# Patient Record
Sex: FEMALE | Race: WHITE | Employment: FULL TIME | URBAN - METROPOLITAN AREA
[De-identification: names, ages, dates, MRNs, and addresses within clinical notes are randomized per-mention and may not be internally consistent; named-entity substitution may affect disease eponyms.]

---

## 2017-07-19 ENCOUNTER — HOSPITAL ENCOUNTER (EMERGENCY)
Age: 22
Discharge: HOME | End: 2017-07-19
Payer: COMMERCIAL

## 2017-07-19 VITALS
BODY MASS INDEX: 30.61 KG/M2 | RESPIRATION RATE: 18 BRPM | HEIGHT: 67 IN | TEMPERATURE: 98 F | OXYGEN SATURATION: 98 % | HEART RATE: 61 BPM | SYSTOLIC BLOOD PRESSURE: 130 MMHG | DIASTOLIC BLOOD PRESSURE: 78 MMHG | WEIGHT: 195 LBS

## 2017-07-19 DIAGNOSIS — L02.91 ABSCESS: Primary | ICD-10-CM

## 2017-07-19 PROCEDURE — 99282 EMERGENCY DEPT VISIT SF MDM: CPT

## 2017-07-19 RX ORDER — ACETAMINOPHEN AND CODEINE PHOSPHATE 300; 30 MG/1; MG/1
1 TABLET ORAL EVERY 4 HOURS PRN
Qty: 10 TABLET | Refills: 0 | Status: ON HOLD | OUTPATIENT
Start: 2017-07-19 | End: 2020-06-06 | Stop reason: HOSPADM

## 2017-07-19 RX ORDER — IBUPROFEN 800 MG/1
800 TABLET ORAL EVERY 8 HOURS PRN
Qty: 20 TABLET | Refills: 0 | Status: ON HOLD | OUTPATIENT
Start: 2017-07-19 | End: 2020-06-06 | Stop reason: HOSPADM

## 2017-07-19 RX ORDER — CLINDAMYCIN HYDROCHLORIDE 300 MG/1
300 CAPSULE ORAL 4 TIMES DAILY
Qty: 40 CAPSULE | Refills: 0 | Status: SHIPPED | OUTPATIENT
Start: 2017-07-19 | End: 2017-07-29

## 2017-07-19 ASSESSMENT — PAIN DESCRIPTION - LOCATION: LOCATION: ARM

## 2017-07-19 ASSESSMENT — ENCOUNTER SYMPTOMS
ABDOMINAL PAIN: 0
NAUSEA: 0
COUGH: 0
ROS SKIN COMMENTS: ABSCESS
VOMITING: 0
SHORTNESS OF BREATH: 0
DIARRHEA: 0

## 2017-07-19 ASSESSMENT — PAIN DESCRIPTION - PAIN TYPE: TYPE: ACUTE PAIN

## 2017-07-19 ASSESSMENT — PAIN DESCRIPTION - DESCRIPTORS: DESCRIPTORS: SHARP

## 2017-07-19 ASSESSMENT — PAIN DESCRIPTION - ORIENTATION: ORIENTATION: RIGHT

## 2017-07-19 ASSESSMENT — PAIN SCALES - GENERAL: PAINLEVEL_OUTOF10: 8

## 2017-07-19 ASSESSMENT — PAIN DESCRIPTION - FREQUENCY: FREQUENCY: CONTINUOUS

## 2020-06-05 ENCOUNTER — APPOINTMENT (OUTPATIENT)
Dept: ULTRASOUND IMAGING | Age: 25
End: 2020-06-05
Payer: COMMERCIAL

## 2020-06-05 ENCOUNTER — HOSPITAL ENCOUNTER (OUTPATIENT)
Age: 25
Setting detail: OBSERVATION
Discharge: HOME | End: 2020-06-06
Attending: OBSTETRICS & GYNECOLOGY | Admitting: OBSTETRICS & GYNECOLOGY
Payer: COMMERCIAL

## 2020-06-05 LAB
ABO/RH: NORMAL
ALBUMIN SERPL-MCNC: 4.1 G/DL (ref 3.5–4.6)
ALP BLD-CCNC: 52 U/L (ref 40–130)
ALT SERPL-CCNC: 12 U/L (ref 0–33)
ANION GAP SERPL CALCULATED.3IONS-SCNC: 9 MEQ/L (ref 9–15)
ANTIBODY SCREEN: NORMAL
APTT: 33.5 SEC (ref 24.4–36.8)
AST SERPL-CCNC: 11 U/L (ref 0–35)
BACTERIA: ABNORMAL /HPF
BASOPHILS ABSOLUTE: 0 K/UL (ref 0–0.2)
BASOPHILS RELATIVE PERCENT: 0.2 %
BILIRUB SERPL-MCNC: 0.6 MG/DL (ref 0.2–0.7)
BILIRUBIN URINE: NEGATIVE
BLOOD, URINE: ABNORMAL
BUN BLDV-MCNC: 8 MG/DL (ref 6–20)
CALCIUM SERPL-MCNC: 9.3 MG/DL (ref 8.5–9.9)
CHLORIDE BLD-SCNC: 96 MEQ/L (ref 95–107)
CLARITY: ABNORMAL
CO2: 26 MEQ/L (ref 20–31)
COLOR: YELLOW
CREAT SERPL-MCNC: 0.61 MG/DL (ref 0.5–0.9)
EOSINOPHILS ABSOLUTE: 0 K/UL (ref 0–0.7)
EOSINOPHILS RELATIVE PERCENT: 0.2 %
GFR AFRICAN AMERICAN: >60
GFR NON-AFRICAN AMERICAN: >60
GLOBULIN: 3.3 G/DL (ref 2.3–3.5)
GLUCOSE BLD-MCNC: 104 MG/DL (ref 70–99)
GLUCOSE URINE: NEGATIVE MG/DL
GONADOTROPIN, CHORIONIC (HCG) QUANT: 414.4 MIU/ML
HCG(URINE) PREGNANCY TEST: POSITIVE
HCG, URINE, POC: POSITIVE
HCT VFR BLD CALC: 40.5 % (ref 37–47)
HEMOGLOBIN: 13.5 G/DL (ref 12–16)
HEPATITIS C ANTIBODY INTERPRETATION: NORMAL
INR BLD: 1.1
KETONES, URINE: 15 MG/DL
LACTIC ACID, SEPSIS: 1.3 MMOL/L (ref 0.5–1.9)
LEUKOCYTE ESTERASE, URINE: ABNORMAL
LIPASE: 13 U/L (ref 12–95)
LYMPHOCYTES ABSOLUTE: 1.1 K/UL (ref 1–4.8)
LYMPHOCYTES RELATIVE PERCENT: 12.4 %
Lab: NORMAL
MCH RBC QN AUTO: 29 PG (ref 27–31.3)
MCHC RBC AUTO-ENTMCNC: 33.4 % (ref 33–37)
MCV RBC AUTO: 87 FL (ref 82–100)
MONOCYTES ABSOLUTE: 0.8 K/UL (ref 0.2–0.8)
MONOCYTES RELATIVE PERCENT: 8.8 %
NEGATIVE QC PASS/FAIL: NORMAL
NEUTROPHILS ABSOLUTE: 6.9 K/UL (ref 1.4–6.5)
NEUTROPHILS RELATIVE PERCENT: 78.4 %
NITRITE, URINE: POSITIVE
PDW BLD-RTO: 13 % (ref 11.5–14.5)
PH UA: 5.5 (ref 5–9)
PLATELET # BLD: 180 K/UL (ref 130–400)
POSITIVE QC PASS/FAIL: NORMAL
POTASSIUM SERPL-SCNC: 3.8 MEQ/L (ref 3.4–4.9)
PROTEIN UA: 30 MG/DL
PROTHROMBIN TIME: 14.3 SEC (ref 12.3–14.9)
RBC # BLD: 4.66 M/UL (ref 4.2–5.4)
RBC UA: ABNORMAL /HPF (ref 0–2)
SARS-COV-2, NAAT: NOT DETECTED
SODIUM BLD-SCNC: 131 MEQ/L (ref 135–144)
SPECIFIC GRAVITY UA: 1.02 (ref 1–1.03)
TOTAL PROTEIN: 7.4 G/DL (ref 6.3–8)
URINE REFLEX TO CULTURE: YES
UROBILINOGEN, URINE: 0.2 E.U./DL
WBC # BLD: 8.8 K/UL (ref 4.8–10.8)
WBC UA: ABNORMAL /HPF (ref 0–5)

## 2020-06-05 PROCEDURE — 76801 OB US < 14 WKS SINGLE FETUS: CPT

## 2020-06-05 PROCEDURE — 87186 SC STD MICRODIL/AGAR DIL: CPT

## 2020-06-05 PROCEDURE — 86901 BLOOD TYPING SEROLOGIC RH(D): CPT

## 2020-06-05 PROCEDURE — 6360000002 HC RX W HCPCS: Performed by: OBSTETRICS & GYNECOLOGY

## 2020-06-05 PROCEDURE — 81001 URINALYSIS AUTO W/SCOPE: CPT

## 2020-06-05 PROCEDURE — 87086 URINE CULTURE/COLONY COUNT: CPT

## 2020-06-05 PROCEDURE — 86592 SYPHILIS TEST NON-TREP QUAL: CPT

## 2020-06-05 PROCEDURE — 6370000000 HC RX 637 (ALT 250 FOR IP): Performed by: PHYSICIAN ASSISTANT

## 2020-06-05 PROCEDURE — 83690 ASSAY OF LIPASE: CPT

## 2020-06-05 PROCEDURE — 96372 THER/PROPH/DIAG INJ SC/IM: CPT

## 2020-06-05 PROCEDURE — 84702 CHORIONIC GONADOTROPIN TEST: CPT

## 2020-06-05 PROCEDURE — 85025 COMPLETE CBC W/AUTO DIFF WBC: CPT

## 2020-06-05 PROCEDURE — 80053 COMPREHEN METABOLIC PANEL: CPT

## 2020-06-05 PROCEDURE — 87077 CULTURE AEROBIC IDENTIFY: CPT

## 2020-06-05 PROCEDURE — 76817 TRANSVAGINAL US OBSTETRIC: CPT

## 2020-06-05 PROCEDURE — 6360000002 HC RX W HCPCS: Performed by: PHYSICIAN ASSISTANT

## 2020-06-05 PROCEDURE — 99218 PR INITIAL OBSERVATION CARE/DAY 30 MINUTES: CPT | Performed by: OBSTETRICS & GYNECOLOGY

## 2020-06-05 PROCEDURE — 85730 THROMBOPLASTIN TIME PARTIAL: CPT

## 2020-06-05 PROCEDURE — 86900 BLOOD TYPING SEROLOGIC ABO: CPT

## 2020-06-05 PROCEDURE — 86850 RBC ANTIBODY SCREEN: CPT

## 2020-06-05 PROCEDURE — 83605 ASSAY OF LACTIC ACID: CPT

## 2020-06-05 PROCEDURE — 86803 HEPATITIS C AB TEST: CPT

## 2020-06-05 PROCEDURE — 87040 BLOOD CULTURE FOR BACTERIA: CPT

## 2020-06-05 PROCEDURE — 96401 CHEMO ANTI-NEOPL SQ/IM: CPT

## 2020-06-05 PROCEDURE — 2580000003 HC RX 258: Performed by: PHYSICIAN ASSISTANT

## 2020-06-05 PROCEDURE — 96375 TX/PRO/DX INJ NEW DRUG ADDON: CPT

## 2020-06-05 PROCEDURE — 99284 EMERGENCY DEPT VISIT MOD MDM: CPT

## 2020-06-05 PROCEDURE — 84703 CHORIONIC GONADOTROPIN ASSAY: CPT

## 2020-06-05 PROCEDURE — 36415 COLL VENOUS BLD VENIPUNCTURE: CPT

## 2020-06-05 PROCEDURE — 96365 THER/PROPH/DIAG IV INF INIT: CPT

## 2020-06-05 PROCEDURE — 2580000003 HC RX 258: Performed by: OBSTETRICS & GYNECOLOGY

## 2020-06-05 PROCEDURE — 96366 THER/PROPH/DIAG IV INF ADDON: CPT

## 2020-06-05 PROCEDURE — 85610 PROTHROMBIN TIME: CPT

## 2020-06-05 PROCEDURE — G0378 HOSPITAL OBSERVATION PER HR: HCPCS

## 2020-06-05 PROCEDURE — U0002 COVID-19 LAB TEST NON-CDC: HCPCS

## 2020-06-05 RX ORDER — METHOTREXATE 25 MG/ML
50 INJECTION, SOLUTION INTRA-ARTERIAL; INTRAMUSCULAR; INTRAVENOUS ONCE
Status: COMPLETED | OUTPATIENT
Start: 2020-06-05 | End: 2020-06-05

## 2020-06-05 RX ORDER — ACETAMINOPHEN 325 MG/1
650 TABLET ORAL ONCE
Status: COMPLETED | OUTPATIENT
Start: 2020-06-05 | End: 2020-06-05

## 2020-06-05 RX ORDER — ACETAMINOPHEN 325 MG/1
650 TABLET ORAL EVERY 4 HOURS PRN
Status: DISCONTINUED | OUTPATIENT
Start: 2020-06-05 | End: 2020-06-06 | Stop reason: HOSPADM

## 2020-06-05 RX ORDER — 0.9 % SODIUM CHLORIDE 0.9 %
1000 INTRAVENOUS SOLUTION INTRAVENOUS ONCE
Status: COMPLETED | OUTPATIENT
Start: 2020-06-05 | End: 2020-06-05

## 2020-06-05 RX ORDER — ONDANSETRON 2 MG/ML
4 INJECTION INTRAMUSCULAR; INTRAVENOUS EVERY 6 HOURS PRN
Status: DISCONTINUED | OUTPATIENT
Start: 2020-06-05 | End: 2020-06-06 | Stop reason: HOSPADM

## 2020-06-05 RX ORDER — METOCLOPRAMIDE HYDROCHLORIDE 5 MG/ML
10 INJECTION INTRAMUSCULAR; INTRAVENOUS EVERY 6 HOURS
Status: DISCONTINUED | OUTPATIENT
Start: 2020-06-05 | End: 2020-06-06 | Stop reason: HOSPADM

## 2020-06-05 RX ORDER — SODIUM CHLORIDE, SODIUM LACTATE, POTASSIUM CHLORIDE, CALCIUM CHLORIDE 600; 310; 30; 20 MG/100ML; MG/100ML; MG/100ML; MG/100ML
INJECTION, SOLUTION INTRAVENOUS CONTINUOUS
Status: DISCONTINUED | OUTPATIENT
Start: 2020-06-05 | End: 2020-06-06 | Stop reason: HOSPADM

## 2020-06-05 RX ADMIN — ACETAMINOPHEN 650 MG: 325 TABLET ORAL at 17:42

## 2020-06-05 RX ADMIN — SODIUM CHLORIDE, POTASSIUM CHLORIDE, SODIUM LACTATE AND CALCIUM CHLORIDE: 600; 310; 30; 20 INJECTION, SOLUTION INTRAVENOUS at 21:55

## 2020-06-05 RX ADMIN — CEFTRIAXONE SODIUM 1 G: 1 INJECTION, POWDER, FOR SOLUTION INTRAMUSCULAR; INTRAVENOUS at 18:12

## 2020-06-05 RX ADMIN — METOCLOPRAMIDE HYDROCHLORIDE 10 MG: 5 INJECTION INTRAMUSCULAR; INTRAVENOUS at 22:49

## 2020-06-05 RX ADMIN — SODIUM CHLORIDE 1000 ML: 9 INJECTION, SOLUTION INTRAVENOUS at 18:10

## 2020-06-05 RX ADMIN — METHOTREXATE SODIUM 95.5 MG: 25 INJECTION, SOLUTION INTRA-ARTERIAL; INTRAMUSCULAR; INTRAVENOUS at 18:40

## 2020-06-05 ASSESSMENT — PAIN SCALES - GENERAL
PAINLEVEL_OUTOF10: 8
PAINLEVEL_OUTOF10: 8
PAINLEVEL_OUTOF10: 3

## 2020-06-05 ASSESSMENT — PAIN DESCRIPTION - PAIN TYPE
TYPE: ACUTE PAIN
TYPE: ACUTE PAIN

## 2020-06-05 ASSESSMENT — ENCOUNTER SYMPTOMS
EYES NEGATIVE: 1
BACK PAIN: 1
GASTROINTESTINAL NEGATIVE: 1
RESPIRATORY NEGATIVE: 1

## 2020-06-05 ASSESSMENT — PAIN DESCRIPTION - ORIENTATION: ORIENTATION: RIGHT

## 2020-06-05 ASSESSMENT — PAIN DESCRIPTION - LOCATION
LOCATION: FLANK
LOCATION: ABDOMEN

## 2020-06-05 NOTE — ED PROVIDER NOTES
[address] [facility] ED  eMERGENCY dEPARTMENT eNCOUnter      Pt Name: Juan M Ruggiero  MRN: [id number]  [place] 1995  Date of evaluation: 6/5/2020  Provider: Eveline Lemon PA-C      HISTORY OF PRESENT ILLNESS    Juan M Ruggiero is a 27 y.o. female who presents to the Emergency Department with chief complaint of urinary frequency and right flank pain for 2 days. Patient states she got on her period over the last week and then bleeding for about 7 days. Patient states that essentially ended yesterday. Patient admits still does not feeling well today. She also admits to some mild nausea. Patient has no other concerns this time. REVIEW OF SYSTEMS       Review of Systems   Constitutional: Positive for fever. HENT: Negative. Eyes: Negative. Respiratory: Negative. Cardiovascular: Negative. Gastrointestinal: Negative. Right flank pain     Endocrine: Negative. Genitourinary: Negative. Musculoskeletal: Positive for back pain. Skin: Negative. Neurological: Negative. Psychiatric/Behavioral: Negative. PAST MEDICAL HISTORY   No past medical history on file. SURGICAL HISTORY     No past surgical history on file. CURRENT MEDICATIONS       Previous Medications    ACETAMINOPHEN-CODEINE (TYLENOL/CODEINE #3) 300-30 MG PER TABLET    Take 1 tablet by mouth every 4 hours as needed for Pain    IBUPROFEN (ADVIL;MOTRIN) 800 MG TABLET    Take 1 tablet by mouth every 8 hours as needed for Pain       ALLERGIES     Patient has no known allergies. FAMILY HISTORY     No family history on file.        SOCIAL HISTORY       Social History     Socioeconomic History    Marital status: Single     Spouse name: Not on file    Number of children: Not on file    Years of education: Not on file    Highest education level: Not on file   Occupational History    Not on file   Social Needs    Financial resource strain: Not on file    Food insecurity     Worry: Not on file

## 2020-06-05 NOTE — ED NOTES
Patient to US via cart. Fluids and tubing on ed cart 28. Tolerated well.        Jamarcus Pickens  06/05/20 1444

## 2020-06-05 NOTE — H&P
Department of Obstetrics and Gynecology   History & Physical    Pt Name: Leim Goodpasture  MRN: [id number] Katie #: [de-identified]  YOB: 1995  Estimated Date of Delivery: None noted. HPI: The patient is a 27 y.o. [id number] female who presents to ER with right flank pain and initial concern by PA was possible uti or kidney stone; however, HCG returns + at 414 and pelvic sono shows nothing in uterus, but gest sac in right tube with no yolk sac or fetus with measurements c/w 4w6d gest; some complex fluid is reported adjacent to this area and in cul-de-sac; patient also found to have significant uti with large leuks and + nitrites ( uc is pending and patient did receive rocephin, 1 gm iv in ER ); her blood type is O+; prior ob hx/o SAB ~ 3 years ago and did not require a D&C; specifically denies any prior STD's; currently does not have an ob/gyn; does not believe she has had a Pap smear in past; BCM-condoms; LMP-5/25/20 and this is described as normal for her; typically flows for 4-5 days q month with 2-3 days heavy; spoke with patient regarding options for treating unruptured early ectopic as eithe surgical or medical with MTX; at this time patient elects for medical management and she is aware that close f/u with local ob/gyn will be necessary to follow HCG levels to zero and that one possibility is that if ectopic persists or ruptures, she would need surgical intervention; spoke with Dr. Millie Kong about this patient and clinical findings; he is willing to take her on as a new patient and states that he would like to see her in his office in 1 week for f/u; at this time she will be coming in hospital for iv hydration and further rx of uti; a COVID-19 test will be done in ER before patient is transferred to floor. Allergies:     Allergies as of 06/05/2020    (No Known Allergies)       Medications:    Current Facility-Administered Medications:     0.9 % sodium chloride bolus, 1,000 mL, Intravenous, Once, Mera Awan through it's conclusion.   Sylwia Mcneill MD

## 2020-06-06 VITALS
RESPIRATION RATE: 18 BRPM | OXYGEN SATURATION: 99 % | SYSTOLIC BLOOD PRESSURE: 116 MMHG | HEIGHT: 66 IN | HEART RATE: 81 BPM | WEIGHT: 173 LBS | TEMPERATURE: 98.5 F | DIASTOLIC BLOOD PRESSURE: 64 MMHG | BODY MASS INDEX: 27.8 KG/M2

## 2020-06-06 PROBLEM — O00.90 ECTOPIC PREGNANCY WITHOUT INTRAUTERINE PREGNANCY: Status: ACTIVE | Noted: 2020-06-06

## 2020-06-06 LAB
ANION GAP SERPL CALCULATED.3IONS-SCNC: 9 MEQ/L (ref 9–15)
BASOPHILS ABSOLUTE: 0 K/UL (ref 0–0.2)
BASOPHILS RELATIVE PERCENT: 0.6 %
BUN BLDV-MCNC: 5 MG/DL (ref 6–20)
CALCIUM SERPL-MCNC: 9.5 MG/DL (ref 8.5–9.9)
CHLORIDE BLD-SCNC: 102 MEQ/L (ref 95–107)
CO2: 26 MEQ/L (ref 20–31)
CREAT SERPL-MCNC: 0.51 MG/DL (ref 0.5–0.9)
EOSINOPHILS ABSOLUTE: 0 K/UL (ref 0–0.7)
EOSINOPHILS RELATIVE PERCENT: 0.4 %
GFR AFRICAN AMERICAN: >60
GFR NON-AFRICAN AMERICAN: >60
GLUCOSE BLD-MCNC: 102 MG/DL (ref 70–99)
GONADOTROPIN, CHORIONIC (HCG) QUANT: 336.1 MIU/ML
HCT VFR BLD CALC: 38.3 % (ref 37–47)
HEMOGLOBIN: 13 G/DL (ref 12–16)
LYMPHOCYTES ABSOLUTE: 1 K/UL (ref 1–4.8)
LYMPHOCYTES RELATIVE PERCENT: 14.6 %
MCH RBC QN AUTO: 29.4 PG (ref 27–31.3)
MCHC RBC AUTO-ENTMCNC: 33.9 % (ref 33–37)
MCV RBC AUTO: 86.8 FL (ref 82–100)
MONOCYTES ABSOLUTE: 0.5 K/UL (ref 0.2–0.8)
MONOCYTES RELATIVE PERCENT: 7.4 %
NEUTROPHILS ABSOLUTE: 5.5 K/UL (ref 1.4–6.5)
NEUTROPHILS RELATIVE PERCENT: 77 %
PDW BLD-RTO: 13 % (ref 11.5–14.5)
PLATELET # BLD: 178 K/UL (ref 130–400)
POTASSIUM SERPL-SCNC: 4.2 MEQ/L (ref 3.4–4.9)
RBC # BLD: 4.41 M/UL (ref 4.2–5.4)
RPR: NORMAL
SODIUM BLD-SCNC: 137 MEQ/L (ref 135–144)
WBC # BLD: 7.2 K/UL (ref 4.8–10.8)

## 2020-06-06 PROCEDURE — 99217 PR OBSERVATION CARE DISCHARGE MANAGEMENT: CPT | Performed by: OBSTETRICS & GYNECOLOGY

## 2020-06-06 PROCEDURE — 36415 COLL VENOUS BLD VENIPUNCTURE: CPT

## 2020-06-06 PROCEDURE — 6360000002 HC RX W HCPCS: Performed by: OBSTETRICS & GYNECOLOGY

## 2020-06-06 PROCEDURE — 80048 BASIC METABOLIC PNL TOTAL CA: CPT

## 2020-06-06 PROCEDURE — 2580000003 HC RX 258: Performed by: OBSTETRICS & GYNECOLOGY

## 2020-06-06 PROCEDURE — 84702 CHORIONIC GONADOTROPIN TEST: CPT

## 2020-06-06 PROCEDURE — 85025 COMPLETE CBC W/AUTO DIFF WBC: CPT

## 2020-06-06 PROCEDURE — 96376 TX/PRO/DX INJ SAME DRUG ADON: CPT

## 2020-06-06 PROCEDURE — G0378 HOSPITAL OBSERVATION PER HR: HCPCS

## 2020-06-06 PROCEDURE — 87389 HIV-1 AG W/HIV-1&-2 AB AG IA: CPT

## 2020-06-06 PROCEDURE — 96375 TX/PRO/DX INJ NEW DRUG ADDON: CPT

## 2020-06-06 RX ORDER — ONDANSETRON 4 MG/1
4 TABLET, ORALLY DISINTEGRATING ORAL 3 TIMES DAILY PRN
Qty: 21 TABLET | Refills: 0 | Status: SHIPPED | OUTPATIENT
Start: 2020-06-06 | End: 2021-07-06

## 2020-06-06 RX ORDER — CEPHALEXIN 500 MG/1
500 CAPSULE ORAL 2 TIMES DAILY
Qty: 14 CAPSULE | Refills: 0 | Status: SHIPPED | OUTPATIENT
Start: 2020-06-06 | End: 2020-06-13

## 2020-06-06 RX ADMIN — SODIUM CHLORIDE, POTASSIUM CHLORIDE, SODIUM LACTATE AND CALCIUM CHLORIDE: 600; 310; 30; 20 INJECTION, SOLUTION INTRAVENOUS at 04:51

## 2020-06-06 RX ADMIN — ONDANSETRON 4 MG: 2 INJECTION INTRAMUSCULAR; INTRAVENOUS at 02:22

## 2020-06-06 RX ADMIN — METOCLOPRAMIDE HYDROCHLORIDE 10 MG: 5 INJECTION INTRAMUSCULAR; INTRAVENOUS at 04:52

## 2020-06-06 RX ADMIN — CEFTRIAXONE SODIUM 1 G: 1 INJECTION, POWDER, FOR SOLUTION INTRAMUSCULAR; INTRAVENOUS at 07:25

## 2020-06-06 NOTE — PLAN OF CARE
Problem: Nausea/Vomiting:  Goal: Absence of nausea/vomiting  Description: Absence of nausea/vomiting  6/6/2020 0920 by Mikie Sharp RN  Outcome: Completed  6/6/2020 0020 by Desi De La Cruz RN  Outcome: Ongoing  Goal: Able to drink  Description: Able to drink  6/6/2020 0920 by Mikie Sharp RN  Outcome: Completed  6/6/2020 0020 by Desi De La Cruz RN  Outcome: Ongoing  Goal: Able to eat  Description: Able to eat  6/6/2020 0920 by Mikie Sharp RN  Outcome: Completed  6/6/2020 0020 by Desi De La Cruz RN  Outcome: Ongoing     Problem: Bleeding:  Goal: Will show no signs and symptoms of excessive bleeding  Description: Will show no signs and symptoms of excessive bleeding  6/6/2020 0920 by Mikie Sharp RN  Outcome: Completed  6/6/2020 0020 by Desi De La Cruz RN  Outcome: Ongoing

## 2020-06-06 NOTE — FLOWSHEET NOTE
Spoke with Dr. Nargis Mcclelland. Would like Rocephin time changed to now.    Electronically signed by Portia Schmitz RN on 6/6/2020 at 6:52 AM

## 2020-06-07 LAB
ORGANISM: ABNORMAL
URINE CULTURE, ROUTINE: ABNORMAL

## 2020-06-08 ENCOUNTER — NURSE ONLY (OUTPATIENT)
Dept: OBGYN CLINIC | Age: 25
End: 2020-06-08

## 2020-06-08 DIAGNOSIS — O00.101 RIGHT TUBAL PREGNANCY WITHOUT INTRAUTERINE PREGNANCY: ICD-10-CM

## 2020-06-08 LAB
GONADOTROPIN, CHORIONIC (HCG) QUANT: 157 MIU/ML
HIV 1,2 COMBO ANTIGEN/ANTIBODY: NEGATIVE

## 2020-06-08 PROCEDURE — 36415 COLL VENOUS BLD VENIPUNCTURE: CPT | Performed by: OBSTETRICS & GYNECOLOGY

## 2020-06-09 ENCOUNTER — OFFICE VISIT (OUTPATIENT)
Dept: OBGYN CLINIC | Age: 25
End: 2020-06-09
Payer: COMMERCIAL

## 2020-06-09 VITALS
WEIGHT: 175 LBS | SYSTOLIC BLOOD PRESSURE: 104 MMHG | HEART RATE: 84 BPM | BODY MASS INDEX: 28.12 KG/M2 | HEIGHT: 66 IN | DIASTOLIC BLOOD PRESSURE: 66 MMHG

## 2020-06-09 DIAGNOSIS — O00.109 TUBAL ECTOPIC PREGNANCY, UNSPECIFIED LATERALITY, UNSPECIFIED WHETHER INTRAUTERINE PREGNANCY PRESENT: ICD-10-CM

## 2020-06-09 LAB — GONADOTROPIN, CHORIONIC (HCG) QUANT: 177.4 MIU/ML

## 2020-06-09 PROCEDURE — 99213 OFFICE O/P EST LOW 20 MIN: CPT | Performed by: OBSTETRICS & GYNECOLOGY

## 2020-06-09 PROCEDURE — 1111F DSCHRG MED/CURRENT MED MERGE: CPT | Performed by: OBSTETRICS & GYNECOLOGY

## 2020-06-10 LAB
BLOOD CULTURE, ROUTINE: NORMAL
CULTURE, BLOOD 2: NORMAL

## 2020-06-17 LAB — GONADOTROPIN, CHORIONIC (HCG) QUANT: 136.9 MIU/ML

## 2021-07-06 ENCOUNTER — OFFICE VISIT (OUTPATIENT)
Dept: OBGYN CLINIC | Age: 26
End: 2021-07-06
Payer: COMMERCIAL

## 2021-07-06 VITALS
SYSTOLIC BLOOD PRESSURE: 118 MMHG | HEART RATE: 76 BPM | HEIGHT: 66 IN | DIASTOLIC BLOOD PRESSURE: 72 MMHG | WEIGHT: 192 LBS | BODY MASS INDEX: 30.86 KG/M2

## 2021-07-06 DIAGNOSIS — O20.0 THREATENED ABORTION IN FIRST TRIMESTER: Primary | ICD-10-CM

## 2021-07-06 PROCEDURE — G8427 DOCREV CUR MEDS BY ELIG CLIN: HCPCS | Performed by: OBSTETRICS & GYNECOLOGY

## 2021-07-06 PROCEDURE — 4004F PT TOBACCO SCREEN RCVD TLK: CPT | Performed by: OBSTETRICS & GYNECOLOGY

## 2021-07-06 PROCEDURE — 99203 OFFICE O/P NEW LOW 30 MIN: CPT | Performed by: OBSTETRICS & GYNECOLOGY

## 2021-07-06 PROCEDURE — G8419 CALC BMI OUT NRM PARAM NOF/U: HCPCS | Performed by: OBSTETRICS & GYNECOLOGY

## 2021-07-08 NOTE — PROGRESS NOTES
Jaleel Torres is a 23 y.o. female who presents here today for complaints of missed menses, vaginal bleeding over past week. Patient has had multiple positive pregnancy tests at home. History of ectopic pregnancy treated with methotrexate once. Vitals:  /72 (Site: Right Upper Arm, Position: Sitting, Cuff Size: Large Adult)   Pulse 76   Ht 5' 6\" (1.676 m)   Wt 192 lb (87.1 kg)   LMP 2021 (Approximate)   BMI 30.99 kg/m²   Allergies:  Patient has no known allergies. No past medical history on file. No past surgical history on file. OB History        2    Para        Term                AB   1    Living           SAB   1    TAB        Ectopic        Molar        Multiple        Live Births                  No family history on file. Social History     Socioeconomic History    Marital status: Single     Spouse name: Not on file    Number of children: Not on file    Years of education: Not on file    Highest education level: Not on file   Occupational History    Not on file   Tobacco Use    Smoking status: Current Every Day Smoker    Smokeless tobacco: Never Used   Substance and Sexual Activity    Alcohol use: Not on file    Drug use: Not on file    Sexual activity: Not on file   Other Topics Concern    Not on file   Social History Narrative    Not on file     Social Determinants of Health     Financial Resource Strain:     Difficulty of Paying Living Expenses:    Food Insecurity:     Worried About Running Out of Food in the Last Year:     [address] in the Last Year:    Transportation Needs:     Lack of Transportation (Medical):      Lack of Transportation (Non-Medical):    Physical Activity:     Days of Exercise per Week:     Minutes of Exercise per Session:    Stress:     Feeling of Stress :    Social Connections:     Frequency of Communication with Friends and Family:     Frequency of Social Gatherings with Friends and Family:     Attends Jehovah's witness Services:     Active Member of Clubs or Organizations:     Attends Club or Organization Meetings:     Marital Status:    Intimate Partner Violence:     Fear of Current or Ex-Partner:     Emotionally Abused:     Physically Abused:     Sexually Abused:        Contraceptive method:  none    Patient's medications, allergies, past medical, surgical, social and family histories were reviewed and updated as appropriate. Review of Systems  As per chief complaint   All other systems reviewed and are negative. Physical Exam:  Vitals:  /72 (Site: Right Upper Arm, Position: Sitting, Cuff Size: Large Adult)   Pulse 76   Ht 5' 6\" (1.676 m)   Wt 192 lb (87.1 kg)   LMP 2021 (Approximate)   BMI 30.99 kg/m²   Lungs: CTAB   Heart : Regular S1/S2, no M/R/G  Abdomen: Soft , NT, ND , + BS   Pelvic exam : deferred    Assessment:      Diagnosis Orders   1. Threatened  in first trimester         Plan:     BHCG ordered   Will follow with serial HCG quants . Possible missed AB   SAB precautions given         No orders of the defined types were placed in this encounter. No orders of the defined types were placed in this encounter. Follow Up:  Return in about 1 week (around 2021) for F/U for results.         Fany Mitchell MD

## 2021-07-10 ENCOUNTER — APPOINTMENT (OUTPATIENT)
Dept: ULTRASOUND IMAGING | Age: 26
End: 2021-07-10
Payer: COMMERCIAL

## 2021-07-10 ENCOUNTER — HOSPITAL ENCOUNTER (EMERGENCY)
Age: 26
Discharge: HOME | End: 2021-07-10
Attending: EMERGENCY MEDICINE
Payer: COMMERCIAL

## 2021-07-10 VITALS
RESPIRATION RATE: 16 BRPM | BODY MASS INDEX: 30.13 KG/M2 | DIASTOLIC BLOOD PRESSURE: 71 MMHG | TEMPERATURE: 97.5 F | SYSTOLIC BLOOD PRESSURE: 117 MMHG | OXYGEN SATURATION: 99 % | WEIGHT: 192 LBS | HEIGHT: 67 IN | HEART RATE: 71 BPM

## 2021-07-10 DIAGNOSIS — R10.9 ABDOMINAL PAIN DURING PREGNANCY IN FIRST TRIMESTER: Primary | ICD-10-CM

## 2021-07-10 DIAGNOSIS — O26.891 ABDOMINAL PAIN DURING PREGNANCY IN FIRST TRIMESTER: Primary | ICD-10-CM

## 2021-07-10 DIAGNOSIS — N30.00 ACUTE CYSTITIS WITHOUT HEMATURIA: ICD-10-CM

## 2021-07-10 LAB
ALBUMIN SERPL-MCNC: 4.3 G/DL (ref 3.5–4.6)
ALP BLD-CCNC: 58 U/L (ref 40–130)
ALT SERPL-CCNC: 16 U/L (ref 0–33)
ANION GAP SERPL CALCULATED.3IONS-SCNC: 8 MEQ/L (ref 9–15)
AST SERPL-CCNC: 14 U/L (ref 0–35)
BACTERIA: ABNORMAL /HPF
BASOPHILS ABSOLUTE: 0 K/UL (ref 0–0.2)
BASOPHILS RELATIVE PERCENT: 0.5 %
BILIRUB SERPL-MCNC: 0.4 MG/DL (ref 0.2–0.7)
BILIRUBIN URINE: NEGATIVE
BLOOD, URINE: ABNORMAL
BUN BLDV-MCNC: 7 MG/DL (ref 6–20)
CALCIUM SERPL-MCNC: 9.8 MG/DL (ref 8.5–9.9)
CHLORIDE BLD-SCNC: 100 MEQ/L (ref 95–107)
CHP ED QC CHECK: YES
CLARITY: CLEAR
CO2: 27 MEQ/L (ref 20–31)
COLOR: YELLOW
CREAT SERPL-MCNC: 0.62 MG/DL (ref 0.5–0.9)
EOSINOPHILS ABSOLUTE: 0 K/UL (ref 0–0.7)
EOSINOPHILS RELATIVE PERCENT: 0.6 %
EPITHELIAL CELLS, UA: ABNORMAL /HPF (ref 0–5)
GFR AFRICAN AMERICAN: >60
GFR NON-AFRICAN AMERICAN: >60
GLOBULIN: 3.1 G/DL (ref 2.3–3.5)
GLUCOSE BLD-MCNC: 108 MG/DL (ref 70–99)
GLUCOSE URINE: NEGATIVE MG/DL
GONADOTROPIN, CHORIONIC (HCG) QUANT: 735.6 MIU/ML
HCT VFR BLD CALC: 40.6 % (ref 37–47)
HEMOGLOBIN: 13.6 G/DL (ref 12–16)
HYALINE CASTS: ABNORMAL /HPF (ref 0–5)
KETONES, URINE: ABNORMAL MG/DL
LEUKOCYTE ESTERASE, URINE: ABNORMAL
LYMPHOCYTES ABSOLUTE: 1.6 K/UL (ref 1–4.8)
LYMPHOCYTES RELATIVE PERCENT: 20.9 %
MCH RBC QN AUTO: 28.9 PG (ref 27–31.3)
MCHC RBC AUTO-ENTMCNC: 33.5 % (ref 33–37)
MCV RBC AUTO: 86.2 FL (ref 82–100)
MONOCYTES ABSOLUTE: 0.6 K/UL (ref 0.2–0.8)
MONOCYTES RELATIVE PERCENT: 7.3 %
NEUTROPHILS ABSOLUTE: 5.3 K/UL (ref 1.4–6.5)
NEUTROPHILS RELATIVE PERCENT: 70.7 %
NITRITE, URINE: NEGATIVE
PDW BLD-RTO: 12.9 % (ref 11.5–14.5)
PH UA: 6.5 (ref 5–9)
PLATELET # BLD: 208 K/UL (ref 130–400)
POTASSIUM SERPL-SCNC: 3.8 MEQ/L (ref 3.4–4.9)
PREGNANCY TEST URINE, POC: POSITIVE
PROTEIN UA: 100 MG/DL
RBC # BLD: 4.71 M/UL (ref 4.2–5.4)
RBC UA: ABNORMAL /HPF (ref 0–5)
SODIUM BLD-SCNC: 135 MEQ/L (ref 135–144)
SPECIFIC GRAVITY UA: 1.01 (ref 1–1.03)
TOTAL PROTEIN: 7.4 G/DL (ref 6.3–8)
UROBILINOGEN, URINE: 1 E.U./DL
WBC # BLD: 7.6 K/UL (ref 4.8–10.8)
WBC UA: ABNORMAL /HPF (ref 0–5)

## 2021-07-10 PROCEDURE — 6370000000 HC RX 637 (ALT 250 FOR IP): Performed by: EMERGENCY MEDICINE

## 2021-07-10 PROCEDURE — 99283 EMERGENCY DEPT VISIT LOW MDM: CPT

## 2021-07-10 PROCEDURE — 80053 COMPREHEN METABOLIC PANEL: CPT

## 2021-07-10 PROCEDURE — 81001 URINALYSIS AUTO W/SCOPE: CPT

## 2021-07-10 PROCEDURE — 36415 COLL VENOUS BLD VENIPUNCTURE: CPT

## 2021-07-10 PROCEDURE — 6360000002 HC RX W HCPCS: Performed by: EMERGENCY MEDICINE

## 2021-07-10 PROCEDURE — 84702 CHORIONIC GONADOTROPIN TEST: CPT

## 2021-07-10 PROCEDURE — 2580000003 HC RX 258: Performed by: EMERGENCY MEDICINE

## 2021-07-10 PROCEDURE — 85025 COMPLETE CBC W/AUTO DIFF WBC: CPT

## 2021-07-10 PROCEDURE — 76817 TRANSVAGINAL US OBSTETRIC: CPT

## 2021-07-10 PROCEDURE — 76801 OB US < 14 WKS SINGLE FETUS: CPT

## 2021-07-10 PROCEDURE — 96365 THER/PROPH/DIAG IV INF INIT: CPT

## 2021-07-10 RX ORDER — ACETAMINOPHEN 500 MG
1000 TABLET ORAL ONCE
Status: COMPLETED | OUTPATIENT
Start: 2021-07-10 | End: 2021-07-10

## 2021-07-10 RX ORDER — .ALPHA.-TOCOPHEROL ACETATE, DL-, ASCORBIC ACID, CHOLECALCIFEROL, CYANOCOBALAMIN, FOLIC ACID, FERROUS FUMARATE, CALCIUM PHOSPHATE, DIBASIC, ANHYDROUS, NIACINAMIDE, PYRIDOXINE HYDROCHLORIDE, RIBOFLAVIN, THIAMINE MONONITRATE, AND VITAMIN A ACETATE 15; 60; 400; 4.5; 1; 27; 50; 13.5; 1.05; 1.2; 1.05; 25 [IU]/1; MG/1; [IU]/1; UG/1; MG/1; MG/1; MG/1; MG/1; MG/1; MG/1; MG/1; [IU]/1
1 TABLET ORAL DAILY
Qty: 30 TABLET | Refills: 2 | Status: SHIPPED | OUTPATIENT
Start: 2021-07-10 | End: 2022-09-26

## 2021-07-10 RX ORDER — MORPHINE SULFATE 2 MG/ML
4 INJECTION, SOLUTION INTRAMUSCULAR; INTRAVENOUS
Status: DISCONTINUED | OUTPATIENT
Start: 2021-07-10 | End: 2021-07-10

## 2021-07-10 RX ORDER — ONDANSETRON 4 MG/1
4 TABLET, ORALLY DISINTEGRATING ORAL 3 TIMES DAILY PRN
Qty: 21 TABLET | Refills: 0 | Status: SHIPPED | OUTPATIENT
Start: 2021-07-10 | End: 2022-09-26

## 2021-07-10 RX ORDER — ACETAMINOPHEN 500 MG
500 TABLET ORAL 4 TIMES DAILY PRN
Qty: 360 TABLET | Refills: 1 | Status: SHIPPED | OUTPATIENT
Start: 2021-07-10 | End: 2022-09-26

## 2021-07-10 RX ORDER — KETOROLAC TROMETHAMINE 30 MG/ML
30 INJECTION, SOLUTION INTRAMUSCULAR; INTRAVENOUS ONCE
Status: DISCONTINUED | OUTPATIENT
Start: 2021-07-10 | End: 2021-07-10

## 2021-07-10 RX ORDER — AMOXICILLIN AND CLAVULANATE POTASSIUM 875; 125 MG/1; MG/1
1 TABLET, FILM COATED ORAL 2 TIMES DAILY
Qty: 20 TABLET | Refills: 0 | Status: SHIPPED | OUTPATIENT
Start: 2021-07-10 | End: 2021-07-20

## 2021-07-10 RX ORDER — ONDANSETRON 2 MG/ML
4 INJECTION INTRAMUSCULAR; INTRAVENOUS ONCE
Status: DISCONTINUED | OUTPATIENT
Start: 2021-07-10 | End: 2021-07-10 | Stop reason: HOSPADM

## 2021-07-10 RX ORDER — 0.9 % SODIUM CHLORIDE 0.9 %
1000 INTRAVENOUS SOLUTION INTRAVENOUS ONCE
Status: COMPLETED | OUTPATIENT
Start: 2021-07-10 | End: 2021-07-10

## 2021-07-10 RX ADMIN — SODIUM CHLORIDE 1000 ML: 9 INJECTION, SOLUTION INTRAVENOUS at 17:13

## 2021-07-10 RX ADMIN — ACETAMINOPHEN 1000 MG: 500 TABLET ORAL at 17:20

## 2021-07-10 RX ADMIN — CEFTRIAXONE SODIUM 1000 MG: 1 INJECTION, POWDER, FOR SOLUTION INTRAMUSCULAR; INTRAVENOUS at 18:42

## 2021-07-10 ASSESSMENT — ENCOUNTER SYMPTOMS
SORE THROAT: 0
VOMITING: 0
BACK PAIN: 0
NAUSEA: 0
ABDOMINAL PAIN: 1
DIARRHEA: 0
COUGH: 0
SHORTNESS OF BREATH: 0

## 2021-07-10 ASSESSMENT — PAIN SCALES - GENERAL
PAINLEVEL_OUTOF10: 3
PAINLEVEL_OUTOF10: 3

## 2021-07-10 ASSESSMENT — PAIN DESCRIPTION - LOCATION: LOCATION: ABDOMEN

## 2021-07-10 ASSESSMENT — PAIN DESCRIPTION - PAIN TYPE: TYPE: ACUTE PAIN

## 2021-07-10 ASSESSMENT — PAIN DESCRIPTION - ORIENTATION: ORIENTATION: MID

## 2021-07-10 ASSESSMENT — PAIN DESCRIPTION - DESCRIPTORS: DESCRIPTORS: SQUEEZING;STABBING

## 2021-07-10 NOTE — ED TRIAGE NOTES
Patient presents with complaints of abdominal pain near her umbilicus x 2-3 days. Patient recently found out she was pregnant. Patient reports vaginal spotting that started today. Patient states pain is intermittent. Patient denies n/v/d. Patient reports ectopic 1 year ago. Patient report urinary frequency, but denies any further urinary complaints.  No distress noted on arrival.

## 2021-07-10 NOTE — ED PROVIDER NOTES
[address] [facility] ED  eMERGENCYdEPARTMENT eNCOUnter      Pt Name: Candelaria De La Rosa  MRN: [id number]  [place] 1995  Date of evaluation: 7/10/2021  Shlomo Hui MD    CHIEF COMPLAINT           HPI  Candelaria De La Rosa is a 23 y.o. female per chart review has a h/o asthma presents to the ED with ab pain. Pt notes gradual onset, moderate, intermittent sharp/aching, periumbilical and suprapubic ab pain x 2 days. Pt denies fever, n/v, cp, sob, dysuria, diarrhea. ROS  Review of Systems   Constitutional: Negative for activity change, chills and fever. HENT: Negative for ear pain and sore throat. Eyes: Negative for visual disturbance. Respiratory: Negative for cough and shortness of breath. Cardiovascular: Negative for chest pain, palpitations and leg swelling. Gastrointestinal: Positive for abdominal pain. Negative for diarrhea, nausea and vomiting. Genitourinary: Negative for dysuria. Musculoskeletal: Negative for back pain. Skin: Negative for rash. Neurological: Negative for dizziness and weakness. Except as noted above the remainder of the review of systems was reviewed and negative. PAST MEDICAL HISTORY     Past Medical History:   Diagnosis Date    Asthma          SURGICAL HISTORY       Past Surgical History:   Procedure Laterality Date    EAR SURGERY           CURRENTMEDICATIONS       Previous Medications    No medications on file       ALLERGIES     Patient has no known allergies. FAMILY HISTORY     History reviewed. No pertinent family history.        SOCIAL HISTORY       Social History     Socioeconomic History    Marital status: Single     Spouse name: None    Number of children: None    Years of education: None    Highest education level: None   Occupational History    None   Tobacco Use    Smoking status: Current Every Day Smoker     Packs/day: 1.00    Smokeless tobacco: Never Used    Tobacco comment: 1 pack per week   Vaping Use    Vaping Use: Never used Substance and Sexual Activity    Alcohol use: Not Currently    Drug use: Yes     Types: Marijuana    Sexual activity: None   Other Topics Concern    None   Social History Narrative    None     Social Determinants of Health     Financial Resource Strain:     Difficulty of Paying Living Expenses:    Food Insecurity:     Worried About Running Out of Food in the Last Year:     [address] in the Last Year:    Transportation Needs:     Lack of Transportation (Medical):  Lack of Transportation (Non-Medical):    Physical Activity:     Days of Exercise per Week:     Minutes of Exercise per Session:    Stress:     Feeling of Stress :    Social Connections:     Frequency of Communication with Friends and Family:     Frequency of Social Gatherings with Friends and Family:     Attends Temple Services:     Active Member of Clubs or Organizations:     Attends Club or Organization Meetings:     Marital Status:    Intimate Partner Violence:     Fear of Current or Ex-Partner:     Emotionally Abused:     Physically Abused:     Sexually Abused:          PHYSICAL EXAM       ED Triage Vitals [07/10/21 1628]   BP Temp Temp Source Pulse Resp SpO2 Height Weight   (!) 149/95 97.5 °F (36.4 °C) Oral 73 20 100 % 5' 7\" (1.702 m) 192 lb (87.1 kg)       Physical Exam  Vitals and nursing note reviewed. Constitutional:       Appearance: She is well-developed. HENT:      Head: Normocephalic. Right Ear: External ear normal.      Left Ear: External ear normal.   Eyes:      Conjunctiva/sclera: Conjunctivae normal.      Pupils: Pupils are equal, round, and reactive to light. Cardiovascular:      Rate and Rhythm: Normal rate and regular rhythm. Heart sounds: Normal heart sounds. Pulmonary:      Effort: Pulmonary effort is normal.      Breath sounds: Normal breath sounds. Abdominal:      General: Bowel sounds are normal. There is no distension. Palpations: Abdomen is soft. Tenderness:  There is abdominal tenderness in the periumbilical area and suprapubic area. There is no guarding or rebound. Musculoskeletal:         General: Normal range of motion. Cervical back: Normal range of motion and neck supple. Skin:     General: Skin is warm and dry. Neurological:      Mental Status: She is alert and oriented to person, place, and time. Psychiatric:         Mood and Affect: Mood normal.           MDM  24 yo female presents to the ED with ab pain. Pt is afebrile, hemodynamically stable. Pt given 1 L NS, IV morphine, IV zofran, IV toradol in the ED. Labs remarkable for .6.  UA shows UTI. Pt given IV rocephin in the ED. Pt reassessed and feels much better. US shows no IUP or ectopic. Pt with 1.9 cm R adnexal cyst.  No ovarian torsion. Pt states that when had an ectopic she had back pain. Given that pt's pain is suprapubic, lower suspicion for ectopic. Likely unable to see any IUP given HCG of only 735. Pt has f/u with ob/gyn next week and will likely get a repeat HCG. Pt educated about ab pain in pregnancy. Pt given prescription for tylenol, zofran, PNV, augmentin. Pt given ab pain warning signs and will f/u with ob/gyn. Pt understands plan. FINAL IMPRESSION      1. Abdominal pain during pregnancy in first trimester    2.  Acute cystitis without hematuria          DISPOSITION/PLAN   DISPOSITION Decision To Discharge 07/10/2021 07:04:44 PM        DISCHARGE MEDICATIONS:  [unfilled]         Beverly Rose MD(electronically signed)  Attending Emergency Physician            Beverly Rose MD  07/10/21 5879

## 2021-07-13 ENCOUNTER — OFFICE VISIT (OUTPATIENT)
Dept: OBGYN CLINIC | Age: 26
End: 2021-07-13
Payer: COMMERCIAL

## 2021-07-13 VITALS
BODY MASS INDEX: 30.7 KG/M2 | DIASTOLIC BLOOD PRESSURE: 68 MMHG | WEIGHT: 191 LBS | HEART RATE: 84 BPM | HEIGHT: 66 IN | SYSTOLIC BLOOD PRESSURE: 100 MMHG

## 2021-07-13 DIAGNOSIS — O20.0 ABORTION, THREATENED, EARLY PREGNANCY: ICD-10-CM

## 2021-07-13 DIAGNOSIS — O20.0 ABORTION, THREATENED, EARLY PREGNANCY: Primary | ICD-10-CM

## 2021-07-13 LAB — GONADOTROPIN, CHORIONIC (HCG) QUANT: 940.6 MIU/ML

## 2021-07-13 PROCEDURE — G8427 DOCREV CUR MEDS BY ELIG CLIN: HCPCS | Performed by: OBSTETRICS & GYNECOLOGY

## 2021-07-13 PROCEDURE — 99213 OFFICE O/P EST LOW 20 MIN: CPT | Performed by: OBSTETRICS & GYNECOLOGY

## 2021-07-13 PROCEDURE — G8419 CALC BMI OUT NRM PARAM NOF/U: HCPCS | Performed by: OBSTETRICS & GYNECOLOGY

## 2021-07-13 PROCEDURE — 4004F PT TOBACCO SCREEN RCVD TLK: CPT | Performed by: OBSTETRICS & GYNECOLOGY

## 2021-07-13 NOTE — PROGRESS NOTES
Ana María Queen is a 23 y.o. female who presents here today for complaints of f/u early pregnancy vs abnormal pregnancy , hx ectopic pregnancy . Here today for follow-up with any increasing hCG levels. Patient denies any cramping and mentions that her vaginal bleeding has stopped since her last visit. Vitals:  /68 (Site: Right Upper Arm, Position: Sitting, Cuff Size: Large Adult)   Pulse 84   Ht 5' 6\" (1.676 m)   Wt 191 lb (86.6 kg)   LMP 2021 (Approximate)   BMI 30.83 kg/m²   Allergies:  Patient has no known allergies. Past Medical History:   Diagnosis Date    Asthma      Past Surgical History:   Procedure Laterality Date    EAR SURGERY       OB History        2    Para        Term                AB   1    Living           SAB   1    TAB        Ectopic        Molar        Multiple        Live Births                  No family history on file. Social History     Socioeconomic History    Marital status: Single     Spouse name: Not on file    Number of children: Not on file    Years of education: Not on file    Highest education level: Not on file   Occupational History    Not on file   Tobacco Use    Smoking status: Current Every Day Smoker     Packs/day: 1.00    Smokeless tobacco: Never Used    Tobacco comment: 1 pack per week   Vaping Use    Vaping Use: Never used   Substance and Sexual Activity    Alcohol use: Not Currently    Drug use: Yes     Types: Marijuana    Sexual activity: Not on file   Other Topics Concern    Not on file   Social History Narrative    Not on file     Social Determinants of Health     Financial Resource Strain:     Difficulty of Paying Living Expenses:    Food Insecurity:     Worried About Running Out of Food in the Last Year:     Ran Out of Food in the Last Year:    Transportation Needs:     Lack of Transportation (Medical):      Lack of Transportation (Non-Medical):    Physical Activity:     Days of Exercise per Week:     Minutes of Exercise per Session:    Stress:     Feeling of Stress :    Social Connections:     Frequency of Communication with Friends and Family:     Frequency of Social Gatherings with Friends and Family:     Attends Spiritism Services:     Active Member of Clubs or Organizations:     Attends Club or Organization Meetings:     Marital Status:    Intimate Partner Violence:     Fear of Current or Ex-Partner:     Emotionally Abused:     Physically Abused:     Sexually Abused:        Contraceptive method:  none    Patient's medications, allergies, past medical, surgical, social and family histories were reviewed and updated as appropriate. Review of Systems  As per chief complaint   All other systems reviewed and are negative. Physical Exam:  Vitals:  /68 (Site: Right Upper Arm, Position: Sitting, Cuff Size: Large Adult)   Pulse 84   Ht 5' 6\" (1.676 m)   Wt 191 lb (86.6 kg)   LMP 2021 (Approximate)   BMI 30.83 kg/m²   Lungs: CTAB   Heart : Regular S1/S2, no M/R/G  Abdomen: Soft , NT, ND , + BS   Pelvic exam : deferred    Assessment:      Diagnosis Orders   1. , threatened, early pregnancy  HCG, Quantitative, Pregnancy       Plan:     Repeat HCG . Continue serial hCGs until threshold of 1500 miu/dl ultrasound will be ordered  F/u in 1 week   Take PNV       Orders Placed This Encounter   Procedures    HCG, Quantitative, Pregnancy     Standing Status:   Future     Number of Occurrences:   1     Standing Expiration Date:   2022     No orders of the defined types were placed in this encounter. Follow Up:  Return in about 1 week (around 2021) for prenatal care.         Carlos Nieves MD

## 2021-07-16 DIAGNOSIS — O20.0 ABORTION, THREATENED, EARLY PREGNANCY: Primary | ICD-10-CM

## 2021-07-19 DIAGNOSIS — O20.0 ABORTION, THREATENED, EARLY PREGNANCY: ICD-10-CM

## 2021-07-19 LAB — GONADOTROPIN, CHORIONIC (HCG) QUANT: 789.1 MIU/ML

## 2021-07-21 ENCOUNTER — OFFICE VISIT (OUTPATIENT)
Dept: OBGYN CLINIC | Age: 26
End: 2021-07-21
Payer: COMMERCIAL

## 2021-07-21 VITALS
HEIGHT: 66 IN | HEART RATE: 76 BPM | WEIGHT: 191 LBS | BODY MASS INDEX: 30.7 KG/M2 | SYSTOLIC BLOOD PRESSURE: 98 MMHG | DIASTOLIC BLOOD PRESSURE: 68 MMHG

## 2021-07-21 DIAGNOSIS — O26.91 ABNORMAL PREGNANCY IN FIRST TRIMESTER: Primary | ICD-10-CM

## 2021-07-21 DIAGNOSIS — O26.91 ABNORMAL PREGNANCY IN FIRST TRIMESTER: ICD-10-CM

## 2021-07-21 LAB — GONADOTROPIN, CHORIONIC (HCG) QUANT: 841.1 MIU/ML

## 2021-07-21 PROCEDURE — 99213 OFFICE O/P EST LOW 20 MIN: CPT | Performed by: OBSTETRICS & GYNECOLOGY

## 2021-07-21 PROCEDURE — 4004F PT TOBACCO SCREEN RCVD TLK: CPT | Performed by: OBSTETRICS & GYNECOLOGY

## 2021-07-21 PROCEDURE — G8419 CALC BMI OUT NRM PARAM NOF/U: HCPCS | Performed by: OBSTETRICS & GYNECOLOGY

## 2021-07-21 PROCEDURE — G8427 DOCREV CUR MEDS BY ELIG CLIN: HCPCS | Performed by: OBSTETRICS & GYNECOLOGY

## 2021-08-02 ENCOUNTER — OFFICE VISIT (OUTPATIENT)
Dept: OBGYN CLINIC | Age: 26
End: 2021-08-02
Payer: COMMERCIAL

## 2021-08-02 VITALS
HEART RATE: 72 BPM | WEIGHT: 188 LBS | SYSTOLIC BLOOD PRESSURE: 102 MMHG | HEIGHT: 66 IN | DIASTOLIC BLOOD PRESSURE: 60 MMHG | BODY MASS INDEX: 30.22 KG/M2

## 2021-08-02 DIAGNOSIS — O26.91 ABNORMAL PREGNANCY IN FIRST TRIMESTER: Primary | ICD-10-CM

## 2021-08-02 LAB — GONADOTROPIN, CHORIONIC (HCG) QUANT: 371.8 MIU/ML

## 2021-08-02 PROCEDURE — 4004F PT TOBACCO SCREEN RCVD TLK: CPT | Performed by: OBSTETRICS & GYNECOLOGY

## 2021-08-02 PROCEDURE — G8419 CALC BMI OUT NRM PARAM NOF/U: HCPCS | Performed by: OBSTETRICS & GYNECOLOGY

## 2021-08-02 PROCEDURE — 99213 OFFICE O/P EST LOW 20 MIN: CPT | Performed by: OBSTETRICS & GYNECOLOGY

## 2021-08-02 PROCEDURE — G8427 DOCREV CUR MEDS BY ELIG CLIN: HCPCS | Performed by: OBSTETRICS & GYNECOLOGY

## 2021-08-02 NOTE — PROGRESS NOTES
Melody Enriquez is a 23 y.o. female who presents here today for complaints of f/u early pregnancy vs abnormal pregnancy , hx ectopic pregnancy . Here today for follow-up with abnormal hCG rise levels noted on previous visits. Patient denies any cramping and mentions that her vaginal bleeding has stopped since her last visit. Vitals:  /60 (Site: Left Upper Arm, Position: Sitting, Cuff Size: Medium Adult)   Pulse 72   Ht 5' 6\" (1.676 m)   Wt 188 lb (85.3 kg)   LMP 2021 (Approximate)   BMI 30.34 kg/m²   Allergies:  Patient has no known allergies. Past Medical History:   Diagnosis Date    Asthma      Past Surgical History:   Procedure Laterality Date    EAR SURGERY       OB History        2    Para        Term                AB   1    Living           SAB   1    TAB        Ectopic        Molar        Multiple        Live Births                  No family history on file. Social History     Socioeconomic History    Marital status: Single     Spouse name: Not on file    Number of children: Not on file    Years of education: Not on file    Highest education level: Not on file   Occupational History    Not on file   Tobacco Use    Smoking status: Current Every Day Smoker     Packs/day: 1.00    Smokeless tobacco: Never Used    Tobacco comment: 1 pack per week   Vaping Use    Vaping Use: Never used   Substance and Sexual Activity    Alcohol use: Not Currently    Drug use: Yes     Types: Marijuana    Sexual activity: Not on file   Other Topics Concern    Not on file   Social History Narrative    Not on file     Social Determinants of Health     Financial Resource Strain:     Difficulty of Paying Living Expenses:    Food Insecurity:     Worried About Running Out of Food in the Last Year:     Ran Out of Food in the Last Year:    Transportation Needs:     Lack of Transportation (Medical):      Lack of Transportation (Non-Medical):    Physical Activity:     Days of

## 2021-08-05 NOTE — PROGRESS NOTES
Jaleel Torres is a 23 y.o. female who presents here today for complaints of f/u early pregnancy vs abnormal pregnancy , hx ectopic pregnancy . Here today for follow-up with any increasing hCG levels. Patient denies any cramping and mentions that her vaginal bleeding has stopped since her last visit. Vitals:  BP 98/68 (Site: Right Upper Arm, Position: Sitting, Cuff Size: Medium Adult)   Pulse 76   Ht 5' 6\" (1.676 m)   Wt 191 lb (86.6 kg)   LMP 2021 (Approximate)   BMI 30.83 kg/m²   Allergies:  Patient has no known allergies. Past Medical History:   Diagnosis Date    Asthma      Past Surgical History:   Procedure Laterality Date    EAR SURGERY       OB History        2    Para        Term                AB   1    Living           SAB   1    TAB        Ectopic        Molar        Multiple        Live Births                  No family history on file. Social History     Socioeconomic History    Marital status: Single     Spouse name: Not on file    Number of children: Not on file    Years of education: Not on file    Highest education level: Not on file   Occupational History    Not on file   Tobacco Use    Smoking status: Current Every Day Smoker     Packs/day: 1.00    Smokeless tobacco: Never Used    Tobacco comment: 1 pack per week   Vaping Use    Vaping Use: Never used   Substance and Sexual Activity    Alcohol use: Not Currently    Drug use: Yes     Types: Marijuana    Sexual activity: Not on file   Other Topics Concern    Not on file   Social History Narrative    Not on file     Social Determinants of Health     Financial Resource Strain:     Difficulty of Paying Living Expenses:    Food Insecurity:     Worried About Running Out of Food in the Last Year:     Ran Out of Food in the Last Year:    Transportation Needs:     Lack of Transportation (Medical):      Lack of Transportation (Non-Medical):    Physical Activity:     Days of Exercise per Week:     Minutes of Exercise per Session:    Stress:     Feeling of Stress :    Social Connections:     Frequency of Communication with Friends and Family:     Frequency of Social Gatherings with Friends and Family:     Attends Nondenominational Services:     Active Member of Clubs or Organizations:     Attends Club or Organization Meetings:     Marital Status:    Intimate Partner Violence:     Fear of Current or Ex-Partner:     Emotionally Abused:     Physically Abused:     Sexually Abused:        Contraceptive method:  none    Patient's medications, allergies, past medical, surgical, social and family histories were reviewed and updated as appropriate. Review of Systems  As per chief complaint   All other systems reviewed and are negative. Physical Exam:  Vitals:  BP 98/68 (Site: Right Upper Arm, Position: Sitting, Cuff Size: Medium Adult)   Pulse 76   Ht 5' 6\" (1.676 m)   Wt 191 lb (86.6 kg)   LMP 06/29/2021 (Approximate)   BMI 30.83 kg/m²   Lungs: CTAB   Heart : Regular S1/S2, no M/R/G  Abdomen: Soft , NT, ND , + BS   Pelvic exam : deferred    Assessment:      Diagnosis Orders   1. Abnormal pregnancy in first trimester  HCG, Quantitative, Pregnancy       Plan:   Still abnormal hCG value  Repeat HCG . Continue serial hCGs until resolution, patient still opting for expectant management  Briefly discussed with patient possibility of D&C or even methotrexate if spontaneous resolution is not achieved within reasonable time, or if patient gets heavy vaginal bleeding necessitating intervention  F/u in 1 week         Orders Placed This Encounter   Procedures    HCG, Quantitative, Pregnancy     Standing Status:   Future     Number of Occurrences:   1     Standing Expiration Date:   7/21/2022     No orders of the defined types were placed in this encounter. Follow Up:  Return in about 1 week (around 7/28/2021) for F/U for results.         Carlos Nieves MD

## 2021-08-13 DIAGNOSIS — O26.91 ABNORMAL PREGNANCY IN FIRST TRIMESTER: ICD-10-CM

## 2021-08-13 LAB — GONADOTROPIN, CHORIONIC (HCG) QUANT: 85 MIU/ML

## 2022-04-19 ENCOUNTER — HOSPITAL ENCOUNTER (EMERGENCY)
Age: 27
Discharge: HOME | End: 2022-04-19
Payer: COMMERCIAL

## 2022-04-19 VITALS
OXYGEN SATURATION: 100 % | WEIGHT: 190 LBS | DIASTOLIC BLOOD PRESSURE: 80 MMHG | HEIGHT: 67 IN | TEMPERATURE: 97 F | RESPIRATION RATE: 17 BRPM | BODY MASS INDEX: 29.82 KG/M2 | SYSTOLIC BLOOD PRESSURE: 124 MMHG | HEART RATE: 91 BPM

## 2022-04-19 DIAGNOSIS — S81.031A PUNCTURE WOUND OF RIGHT KNEE, INITIAL ENCOUNTER: Primary | ICD-10-CM

## 2022-04-19 PROCEDURE — 99284 EMERGENCY DEPT VISIT MOD MDM: CPT

## 2022-04-19 PROCEDURE — 6360000002 HC RX W HCPCS: Performed by: STUDENT IN AN ORGANIZED HEALTH CARE EDUCATION/TRAINING PROGRAM

## 2022-04-19 PROCEDURE — 6370000000 HC RX 637 (ALT 250 FOR IP): Performed by: STUDENT IN AN ORGANIZED HEALTH CARE EDUCATION/TRAINING PROGRAM

## 2022-04-19 PROCEDURE — 90715 TDAP VACCINE 7 YRS/> IM: CPT | Performed by: STUDENT IN AN ORGANIZED HEALTH CARE EDUCATION/TRAINING PROGRAM

## 2022-04-19 PROCEDURE — 90471 IMMUNIZATION ADMIN: CPT | Performed by: STUDENT IN AN ORGANIZED HEALTH CARE EDUCATION/TRAINING PROGRAM

## 2022-04-19 RX ORDER — SULFAMETHOXAZOLE AND TRIMETHOPRIM 800; 160 MG/1; MG/1
1 TABLET ORAL 2 TIMES DAILY
Qty: 14 TABLET | Refills: 0 | Status: SHIPPED | OUTPATIENT
Start: 2022-04-19 | End: 2022-04-26

## 2022-04-19 RX ORDER — SULFAMETHOXAZOLE AND TRIMETHOPRIM 800; 160 MG/1; MG/1
1 TABLET ORAL ONCE
Status: COMPLETED | OUTPATIENT
Start: 2022-04-19 | End: 2022-04-19

## 2022-04-19 RX ORDER — IBUPROFEN 800 MG/1
800 TABLET ORAL 2 TIMES DAILY PRN
Qty: 30 TABLET | Refills: 1 | Status: SHIPPED | OUTPATIENT
Start: 2022-04-19

## 2022-04-19 RX ADMIN — SULFAMETHOXAZOLE AND TRIMETHOPRIM 1 TABLET: 800; 160 TABLET ORAL at 15:15

## 2022-04-19 RX ADMIN — TETANUS TOXOID, REDUCED DIPHTHERIA TOXOID AND ACELLULAR PERTUSSIS VACCINE, ADSORBED 0.5 ML: 5; 2.5; 8; 8; 2.5 SUSPENSION INTRAMUSCULAR at 15:24

## 2022-04-19 ASSESSMENT — PAIN - FUNCTIONAL ASSESSMENT: PAIN_FUNCTIONAL_ASSESSMENT: 0-10

## 2022-04-19 ASSESSMENT — PAIN DESCRIPTION - ORIENTATION: ORIENTATION: RIGHT

## 2022-04-19 ASSESSMENT — PAIN DESCRIPTION - DESCRIPTORS: DESCRIPTORS: ACHING

## 2022-04-19 ASSESSMENT — PAIN SCALES - GENERAL: PAINLEVEL_OUTOF10: 6

## 2022-04-19 ASSESSMENT — PAIN DESCRIPTION - LOCATION: LOCATION: KNEE

## 2022-04-19 NOTE — ED PROVIDER NOTES
[address] [facility] ED  eMERGENCYdEPARTMENT eNCOUnter      Pt Name: Maren Shaw  MRN: [id number]  [place] 1995  Date of evaluation: 4/19/2022  Provider:Hiram Clarke PA-C    CHIEF COMPLAINT           HPI  Maren Shaw is a 27 y.o. female presents with right knee pain. Pt reports sudden onset, severe, sharp non radiating pain to the right knee after a puncture wound earlier today. Pt denies fever, chills, redness, numbness, tingling. ROS  Review of Systems   Constitutional: Negative for chills, fatigue and fever. HENT: Negative for ear pain, hearing loss and sore throat. Eyes: Negative for pain and visual disturbance. Respiratory: Negative for shortness of breath. Cardiovascular: Negative for chest pain. Gastrointestinal: Negative for diarrhea and nausea. Endocrine: Negative for cold intolerance. Genitourinary: Negative for hematuria. Musculoskeletal: Negative for back pain. Skin: Positive for wound. Negative for rash. Neurological: Negative for dizziness and headaches. Psychiatric/Behavioral: Negative for behavioral problems and confusion. Except as noted above the remainder of the review of systems was reviewed and negative.        PAST MEDICAL HISTORY     Past Medical History:   Diagnosis Date    Asthma          SURGICAL HISTORY       Past Surgical History:   Procedure Laterality Date    EAR SURGERY      EAR SURGERY      TONSILLECTOMY           CURRENTMEDICATIONS       Discharge Medication List as of 4/19/2022  3:50 PM      CONTINUE these medications which have NOT CHANGED    Details   acetaminophen (TYLENOL) 500 MG tablet Take 1 tablet by mouth 4 times daily as needed for Pain, Disp-360 tablet, R-1Print      ondansetron (ZOFRAN-ODT) 4 MG disintegrating tablet Take 1 tablet by mouth 3 times daily as needed for Nausea or Vomiting, Disp-21 tablet, R-0Print      Prenatal Vit-Fe Fumarate-FA (PNV FOLIC ACID + IRON) 48-5 MG TABS Take 1 tablet by mouth daily, Disp-30 tablet, R-2Print             ALLERGIES     Patient has no known allergies. FAMILY HISTORY     History reviewed. No pertinent family history. SOCIAL HISTORY       Social History     Socioeconomic History    Marital status: Single     Spouse name: None    Number of children: None    Years of education: None    Highest education level: None   Occupational History    None   Tobacco Use    Smoking status: Current Every Day Smoker     Packs/day: 0.25    Smokeless tobacco: Never Used    Tobacco comment: 1 pack per week   Vaping Use    Vaping Use: Never used   Substance and Sexual Activity    Alcohol use: Not Currently    Drug use: Yes     Types: Marijuana Natasha November)    Sexual activity: None   Other Topics Concern    None   Social History Narrative    None     Social Determinants of Health     Financial Resource Strain:     Difficulty of Paying Living Expenses: Not on file   Food Insecurity:     Worried About Running Out of Food in the Last Year: Not on file    Cele of Food in the Last Year: Not on file   Transportation Needs:     Lack of Transportation (Medical): Not on file    Lack of Transportation (Non-Medical):  Not on file   Physical Activity:     Days of Exercise per Week: Not on file    Minutes of Exercise per Session: Not on file   Stress:     Feeling of Stress : Not on file   Social Connections:     Frequency of Communication with Friends and Family: Not on file    Frequency of Social Gatherings with Friends and Family: Not on file    Attends Anabaptist Services: Not on file    Active Member of Clubs or Organizations: Not on file    Attends Club or Organization Meetings: Not on file    Marital Status: Not on file   Intimate Partner Violence:     Fear of Current or Ex-Partner: Not on file    Emotionally Abused: Not on file    Physically Abused: Not on file    Sexually Abused: Not on file   Housing Stability:     Unable to Pay for Housing in the Last Year: Not on file    Number of Places Lived in the Last Year: Not on file    Unstable Housing in the Last Year: Not on file         PHYSICAL EXAM       ED Triage Vitals [04/19/22 1458]   BP Temp Temp Source Pulse Resp SpO2 Height Weight   124/80 97 °F (36.1 °C) Temporal 91 17 100 % 5' 7\" (1.702 m) 190 lb (86.2 kg)       Physical Exam  Constitutional:       Appearance: Normal appearance. HENT:      Head: Normocephalic and atraumatic. Right Ear: External ear normal.      Left Ear: External ear normal.      Nose: Nose normal.      Mouth/Throat:      Mouth: Mucous membranes are moist.   Eyes:      Extraocular Movements: Extraocular movements intact. Conjunctiva/sclera: Conjunctivae normal.   Cardiovascular:      Rate and Rhythm: Normal rate and regular rhythm. Heart sounds: Normal heart sounds. Pulmonary:      Effort: Pulmonary effort is normal.      Breath sounds: Normal breath sounds. No stridor. No wheezing or rhonchi. Abdominal:      Palpations: Abdomen is soft. Tenderness: There is no abdominal tenderness. Musculoskeletal:         General: Normal range of motion. Cervical back: Normal range of motion and neck supple. No tenderness. Legs:    Skin:     General: Skin is warm and dry. Neurological:      General: No focal deficit present. Mental Status: She is alert and oriented to person, place, and time. Psychiatric:         Mood and Affect: Mood normal.         Behavior: Behavior normal.           MDM  This is a 26 y/o female presenting with knee pain. Pt is afebrile and HD stable. Given Tdap, bactrim. Pt agreeable to d/c with abx therapy and NSAIDs for pain as needed. She will f/u with PCP and return if symptoms change or worsen. FINAL IMPRESSION      1.  Puncture wound of right knee, initial encounter          DISPOSITION/PLAN   DISPOSITION Decision To Discharge 04/19/2022 03:37:48 PM        DISCHARGE MEDICATIONS:  [unfilled]         Jon Adams PA-C(electronically signed)  Attending Emergency Physician           Caleb Gaston PA-C  04/27/22 1411

## 2022-04-19 NOTE — Clinical Note
Jair Wesley was seen and treated in our emergency department on 4/19/2022. She may return to work on 04/20/2022. If you have any questions or concerns, please don't hesitate to call.       Claire Seymour PA-C

## 2022-04-27 ASSESSMENT — ENCOUNTER SYMPTOMS
EYE PAIN: 0
BACK PAIN: 0
DIARRHEA: 0
NAUSEA: 0
SORE THROAT: 0
SHORTNESS OF BREATH: 0

## 2022-09-26 ENCOUNTER — OFFICE VISIT (OUTPATIENT)
Dept: PRIMARY CARE CLINIC | Age: 27
End: 2022-09-26
Payer: COMMERCIAL

## 2022-09-26 VITALS
SYSTOLIC BLOOD PRESSURE: 124 MMHG | BODY MASS INDEX: 32.33 KG/M2 | HEIGHT: 67 IN | WEIGHT: 206 LBS | HEART RATE: 72 BPM | OXYGEN SATURATION: 97 % | TEMPERATURE: 97.2 F | DIASTOLIC BLOOD PRESSURE: 78 MMHG

## 2022-09-26 DIAGNOSIS — F17.200 SMOKING: ICD-10-CM

## 2022-09-26 DIAGNOSIS — Z02.1 PHYSICAL EXAM, PRE-EMPLOYMENT: Primary | ICD-10-CM

## 2022-09-26 PROCEDURE — 99385 PREV VISIT NEW AGE 18-39: CPT | Performed by: INTERNAL MEDICINE

## 2022-09-26 SDOH — ECONOMIC STABILITY: FOOD INSECURITY: WITHIN THE PAST 12 MONTHS, YOU WORRIED THAT YOUR FOOD WOULD RUN OUT BEFORE YOU GOT MONEY TO BUY MORE.: NEVER TRUE

## 2022-09-26 SDOH — ECONOMIC STABILITY: FOOD INSECURITY: WITHIN THE PAST 12 MONTHS, THE FOOD YOU BOUGHT JUST DIDN'T LAST AND YOU DIDN'T HAVE MONEY TO GET MORE.: NEVER TRUE

## 2022-09-26 ASSESSMENT — PATIENT HEALTH QUESTIONNAIRE - PHQ9
SUM OF ALL RESPONSES TO PHQ9 QUESTIONS 1 & 2: 0
SUM OF ALL RESPONSES TO PHQ QUESTIONS 1-9: 0
2. FEELING DOWN, DEPRESSED OR HOPELESS: 0
SUM OF ALL RESPONSES TO PHQ QUESTIONS 1-9: 0
1. LITTLE INTEREST OR PLEASURE IN DOING THINGS: 0
SUM OF ALL RESPONSES TO PHQ QUESTIONS 1-9: 0
SUM OF ALL RESPONSES TO PHQ QUESTIONS 1-9: 0

## 2022-09-26 ASSESSMENT — ENCOUNTER SYMPTOMS
NAUSEA: 0
COUGH: 0
WHEEZING: 0
VOMITING: 0
CHEST TIGHTNESS: 0
SHORTNESS OF BREATH: 0

## 2022-09-26 ASSESSMENT — SOCIAL DETERMINANTS OF HEALTH (SDOH): HOW HARD IS IT FOR YOU TO PAY FOR THE VERY BASICS LIKE FOOD, HOUSING, MEDICAL CARE, AND HEATING?: NOT VERY HARD

## 2022-09-26 NOTE — PROGRESS NOTES
Subjective:      Patient ID: Paula Boggs is a 27 y.o. female who presents today for:  Chief Complaint   Patient presents with    \[facility] [place] [facility]\"" Care     Patient needs physical for work        HPI    Past Medical History:   Diagnosis Date    Asthma      Past Surgical History:   Procedure Laterality Date    EAR SURGERY      EAR SURGERY      TONSILLECTOMY       No family history on file. No Known Allergies  Current Outpatient Medications on File Prior to Visit   Medication Sig Dispense Refill    ibuprofen (ADVIL;MOTRIN) 800 MG tablet Take 1 tablet by mouth 2 times daily as needed for Pain 30 tablet 1    acetaminophen (TYLENOL) 500 MG tablet Take 1 tablet by mouth 4 times daily as needed for Pain 360 tablet 1    ondansetron (ZOFRAN-ODT) 4 MG disintegrating tablet Take 1 tablet by mouth 3 times daily as needed for Nausea or Vomiting (Patient not taking: No sig reported) 21 tablet 0    Prenatal Vit-Fe Fumarate-FA (PNV FOLIC ACID + IRON) 51-8 MG TABS Take 1 tablet by mouth daily (Patient not taking: Reported on 9/26/2022) 30 tablet 2     No current facility-administered medications on file prior to visit. Review of Systems    Objective:   /78 (Site: Right Upper Arm, Position: Sitting, Cuff Size: Large Adult)   Pulse 72   Temp 97.2 °F (36.2 °C) (Temporal)   Ht 5' 7\" (1.702 m)   Wt 206 lb (93.4 kg)   SpO2 97%   BMI 32.26 kg/m²     Physical Exam    Assessment:      There are no diagnoses linked to this encounter.     Health Maintenance   Topic Date Due    COVID-19 Vaccine (1) Never done    Pneumococcal 0-64 years Vaccine (1 - PCV) Never done    Depression Screen  Never done    Chlamydia screen  Never done    Pap smear  Never done    Flu vaccine (1) 08/01/2022    DTaP/Tdap/Td vaccine (9 - Td or Tdap) 04/19/2032    Hepatitis B vaccine  Completed    Hib vaccine  Completed    HPV vaccine  Completed    Varicella vaccine  Completed    Meningococcal (ACWY) vaccine  Completed    Hepatitis C screen  Completed    HIV screen  Completed    Hepatitis A vaccine  Aged Out            Plan:      No orders of the defined types were placed in this encounter. No orders of the defined types were placed in this encounter. No follow-ups on file. Patient counseled on treatment rationale and possible medication adverse effects; verbalizes understanding and willing to proceed with care.     Lora Grace MD

## 2022-09-26 NOTE — PROGRESS NOTES
2022    Douglas Nye (:  1995) is a 27 y.o. female, here for a pre-employment physical. Requesting completion of associated paperwork. Patient states she is doing well overall; offers no medical complaints today. Medical history significant only for mild, intermittent Asthma; controlled on inhaled albuterol. Reports no recent exacerbations or hospitalizations. Patient Active Problem List   Diagnosis    Right tubal pregnancy without intrauterine pregnancy    Acute cystitis with hematuria    Fever    Ectopic pregnancy without intrauterine pregnancy       Review of Systems   Constitutional:  Negative for activity change, chills, diaphoresis, fatigue and fever. Respiratory:  Negative for cough, chest tightness, shortness of breath and wheezing. Cardiovascular:  Negative for chest pain, palpitations and leg swelling. Gastrointestinal:  Negative for nausea and vomiting. Neurological:  Negative for dizziness, syncope, light-headedness and headaches. Prior to Visit Medications    Medication Sig Taking?  Authorizing Provider   ibuprofen (ADVIL;MOTRIN) 800 MG tablet Take 1 tablet by mouth 2 times daily as needed for Pain Yes Gentry Silva PA-C   acetaminophen (TYLENOL) 500 MG tablet Take 1 tablet by mouth 4 times daily as needed for Pain Yes Juvencio Dasilva MD   ondansetron (ZOFRAN-ODT) 4 MG disintegrating tablet Take 1 tablet by mouth 3 times daily as needed for Nausea or Vomiting  Patient not taking: No sig reported  Juvencio Dasilva MD   Prenatal Vit-Fe Fumarate-FA (PNV FOLIC ACID + IRON) 17-9 MG TABS Take 1 tablet by mouth daily  Patient not taking: Reported on 2022  Juvencio Dasilva MD        No Known Allergies    Past Medical History:   Diagnosis Date    Asthma        Past Surgical History:   Procedure Laterality Date    EAR SURGERY      EAR SURGERY      TONSILLECTOMY         Social History     Socioeconomic History    Marital status: Single     Spouse name: Not on file    Number of children: Not on file    Years of education: Not on file    Highest education level: Not on file   Occupational History    Not on file   Tobacco Use    Smoking status: Every Day     Packs/day: 0.25     Types: Cigarettes    Smokeless tobacco: Never    Tobacco comments:     1 pack per week   Vaping Use    Vaping Use: Never used   Substance and Sexual Activity    Alcohol use: Not Currently    Drug use: Yes     Types: Marijuana Darryle Pimple)    Sexual activity: Not on file   Other Topics Concern    Not on file   Social History Narrative    Not on file     Social Determinants of Health     Financial Resource Strain: Low Risk     Difficulty of Paying Living Expenses: Not very hard   Food Insecurity: No Food Insecurity    Worried About Running Out of Food in the Last Year: Never true    Ran Out of Food in the Last Year: Never true   Transportation Needs: Not on file   Physical Activity: Not on file   Stress: Not on file   Social Connections: Not on file   Intimate Partner Violence: Not on file   Housing Stability: Not on file        No family history on file. ADVANCE DIRECTIVE: N, <no information>    Vitals:    09/26/22 1429   BP: 124/78   Site: Right Upper Arm   Position: Sitting   Cuff Size: Large Adult   Pulse: 72   Temp: 97.2 °F (36.2 °C)   TempSrc: Temporal   SpO2: 97%   Weight: 206 lb (93.4 kg)   Height: 5' 7\" (1.702 m)     Estimated body mass index is 32.26 kg/m² as calculated from the following:    Height as of this encounter: 5' 7\" (1.702 m). Weight as of this encounter: 206 lb (93.4 kg). Physical Exam  Constitutional:       General: She is not in acute distress. Appearance: Normal appearance. She is normal weight. She is not diaphoretic. HENT:      Head: Normocephalic and atraumatic. Cardiovascular:      Rate and Rhythm: Normal rate and regular rhythm. Pulses: Normal pulses. Heart sounds: Normal heart sounds. No murmur heard. No friction rub.    Pulmonary:      Effort: Pulmonary effort is normal. No respiratory distress. Breath sounds: Normal breath sounds. No wheezing or rhonchi. Chest:      Chest wall: No tenderness. Abdominal:      General: Abdomen is flat. Bowel sounds are normal. There is no distension. Palpations: Abdomen is soft. Tenderness: no abdominal tenderness   Musculoskeletal:         General: No tenderness. Normal range of motion. Right lower leg: No edema. Left lower leg: No edema. Neurological:      Mental Status: She is alert. No flowsheet data found. Lab Results   Component Value Date/Time    GLUCOSE 108 07/10/2021 05:00 PM       The ASCVD Risk score (Jaime DEGROOT, et al., 2019) failed to calculate for the following reasons: The 2019 ASCVD risk score is only valid for ages 36 to 78    Immunization History   Administered Date(s) Administered    Tdap (Boostrix, Adacel) 04/19/2022       Health Maintenance   Topic Date Due    COVID-19 Vaccine (1) Never done    Pneumococcal 0-64 years Vaccine (1 - PCV) Never done    Depression Screen  Never done    Chlamydia screen  Never done    Pap smear  Never done    Flu vaccine (1) 08/01/2022    DTaP/Tdap/Td vaccine (9 - Td or Tdap) 04/19/2032    Hepatitis B vaccine  Completed    Hib vaccine  Completed    HPV vaccine  Completed    Varicella vaccine  Completed    Meningococcal (ACWY) vaccine  Completed    Hepatitis C screen  Completed    HIV screen  Completed    Hepatitis A vaccine  Aged Out       Assessment & Plan   Physical exam, pre-employment  - Medical history reviewed  - Physical performed; no functional limitations. - Cleared for employment from a medical standpoint. Smoking  - Counseled on cessation. No follow-ups on file.          --Laisha Ledezma MD

## 2023-06-24 ENCOUNTER — HOSPITAL ENCOUNTER (EMERGENCY)
Age: 28
Discharge: HOME | End: 2023-06-24
Payer: COMMERCIAL

## 2023-06-24 VITALS
WEIGHT: 185 LBS | TEMPERATURE: 97.8 F | DIASTOLIC BLOOD PRESSURE: 79 MMHG | OXYGEN SATURATION: 98 % | RESPIRATION RATE: 14 BRPM | SYSTOLIC BLOOD PRESSURE: 128 MMHG | BODY MASS INDEX: 29.03 KG/M2 | HEIGHT: 67 IN | HEART RATE: 70 BPM

## 2023-06-24 DIAGNOSIS — K04.7 DENTAL INFECTION: Primary | ICD-10-CM

## 2023-06-24 PROCEDURE — 6370000000 HC RX 637 (ALT 250 FOR IP): Performed by: PHYSICIAN ASSISTANT

## 2023-06-24 PROCEDURE — 99283 EMERGENCY DEPT VISIT LOW MDM: CPT

## 2023-06-24 RX ORDER — IBUPROFEN 800 MG/1
800 TABLET ORAL EVERY 8 HOURS PRN
Qty: 20 TABLET | Refills: 0 | Status: SHIPPED | OUTPATIENT
Start: 2023-06-24

## 2023-06-24 RX ORDER — CLINDAMYCIN HYDROCHLORIDE 300 MG/1
300 CAPSULE ORAL 4 TIMES DAILY
Qty: 40 CAPSULE | Refills: 0 | Status: SHIPPED | OUTPATIENT
Start: 2023-06-24 | End: 2023-07-04

## 2023-06-24 RX ORDER — CLINDAMYCIN HYDROCHLORIDE 150 MG/1
300 CAPSULE ORAL ONCE
Status: COMPLETED | OUTPATIENT
Start: 2023-06-24 | End: 2023-06-24

## 2023-06-24 RX ADMIN — CLINDAMYCIN HYDROCHLORIDE 300 MG: 150 CAPSULE ORAL at 17:26

## 2023-06-24 ASSESSMENT — PAIN - FUNCTIONAL ASSESSMENT: PAIN_FUNCTIONAL_ASSESSMENT: 0-10

## 2023-06-24 ASSESSMENT — PAIN SCALES - GENERAL: PAINLEVEL_OUTOF10: 4

## 2023-06-24 ASSESSMENT — ENCOUNTER SYMPTOMS
SORE THROAT: 0
FACIAL SWELLING: 1
SHORTNESS OF BREATH: 0
TROUBLE SWALLOWING: 0

## 2023-06-24 ASSESSMENT — PAIN DESCRIPTION - LOCATION: LOCATION: TEETH

## 2023-06-24 NOTE — ED PROVIDER NOTES
[address] [facility] ED  eMERGENCYdEPARTMENT eNCOUnter        Pt Name: Heather Murillo  MRN: [id number]  [place] 1995of evaluation: 6/24/2023  Provider:Ifeanyi Becerra PA-C    CHIEF COMPLAINT       Chief Complaint   Patient presents with    Dental Pain     Started Wednesday          HISTORY OF PRESENT ILLNESS  (Location/Symptom, Timing/Onset, Context/Setting, Quality, Duration, Modifying Factors, Severity.)   Heather Murillo is a 24 y.o. female who presents to the emergency department with a 3-day history of dental pain to the left maxillary region. Patient states broken tooth on each side of the maxilla but the left side has been causing her issues since Wednesday. Patient called her dentist and made an appointment for next week however she states that she woke up this morning with the left side of her face swollen. She denies any sore throats or difficulty swallowing. No known fevers. HPI    Nursing Notes were reviewed and I agree. REVIEW OF SYSTEMS    (2-9 systems for level 4, 10 or more for level 5)     Review of Systems   Constitutional:  Negative for chills and fever. HENT:  Positive for dental problem and facial swelling. Negative for sore throat and trouble swallowing. Respiratory:  Negative for shortness of breath. Cardiovascular:  Negative for chest pain. as noted above the remainder of the review of systems was reviewed and negative. PAST MEDICAL HISTORY     Past Medical History:   Diagnosis Date    Asthma          SURGICAL HISTORY       Past Surgical History:   Procedure Laterality Date    EAR SURGERY      EAR SURGERY      TONSILLECTOMY           CURRENT MEDICATIONS       Previous Medications    IBUPROFEN (ADVIL;MOTRIN) 800 MG TABLET    Take 1 tablet by mouth 2 times daily as needed for Pain       ALLERGIES     Patient has no known allergies. HISTORY     History reviewed. No pertinent family history.        SOCIAL HISTORY       Social History     Socioeconomic History

## 2023-08-29 ENCOUNTER — OFFICE VISIT (OUTPATIENT)
Dept: FAMILY MEDICINE CLINIC | Age: 28
End: 2023-08-29
Payer: COMMERCIAL

## 2023-08-29 VITALS
SYSTOLIC BLOOD PRESSURE: 124 MMHG | HEIGHT: 67 IN | BODY MASS INDEX: 30.45 KG/M2 | HEART RATE: 86 BPM | OXYGEN SATURATION: 98 % | DIASTOLIC BLOOD PRESSURE: 68 MMHG | WEIGHT: 194 LBS | TEMPERATURE: 98.1 F

## 2023-08-29 DIAGNOSIS — H66.016 RECURRENT ACUTE SUPPURATIVE OTITIS MEDIA WITH SPONTANEOUS RUPTURE OF BOTH TYMPANIC MEMBRANES: Primary | ICD-10-CM

## 2023-08-29 PROCEDURE — G8417 CALC BMI ABV UP PARAM F/U: HCPCS | Performed by: NURSE PRACTITIONER

## 2023-08-29 PROCEDURE — G8427 DOCREV CUR MEDS BY ELIG CLIN: HCPCS | Performed by: NURSE PRACTITIONER

## 2023-08-29 PROCEDURE — 99203 OFFICE O/P NEW LOW 30 MIN: CPT | Performed by: NURSE PRACTITIONER

## 2023-08-29 PROCEDURE — 4004F PT TOBACCO SCREEN RCVD TLK: CPT | Performed by: NURSE PRACTITIONER

## 2023-08-29 RX ORDER — AMOXICILLIN AND CLAVULANATE POTASSIUM 875; 125 MG/1; MG/1
1 TABLET, FILM COATED ORAL 2 TIMES DAILY
COMMUNITY
Start: 2023-08-26

## 2023-08-29 RX ORDER — OFLOXACIN 3 MG/ML
SOLUTION AURICULAR (OTIC)
COMMUNITY
Start: 2023-08-27

## 2023-08-29 SDOH — HEALTH STABILITY: PHYSICAL HEALTH: ON AVERAGE, HOW MANY DAYS PER WEEK DO YOU ENGAGE IN MODERATE TO STRENUOUS EXERCISE (LIKE A BRISK WALK)?: 0 DAYS

## 2023-08-29 SDOH — ECONOMIC STABILITY: HOUSING INSECURITY
IN THE LAST 12 MONTHS, WAS THERE A TIME WHEN YOU DID NOT HAVE A STEADY PLACE TO SLEEP OR SLEPT IN A SHELTER (INCLUDING NOW)?: NO

## 2023-08-29 SDOH — ECONOMIC STABILITY: FOOD INSECURITY: WITHIN THE PAST 12 MONTHS, THE FOOD YOU BOUGHT JUST DIDN'T LAST AND YOU DIDN'T HAVE MONEY TO GET MORE.: NEVER TRUE

## 2023-08-29 SDOH — ECONOMIC STABILITY: FOOD INSECURITY: WITHIN THE PAST 12 MONTHS, YOU WORRIED THAT YOUR FOOD WOULD RUN OUT BEFORE YOU GOT MONEY TO BUY MORE.: NEVER TRUE

## 2023-08-29 SDOH — ECONOMIC STABILITY: INCOME INSECURITY: HOW HARD IS IT FOR YOU TO PAY FOR THE VERY BASICS LIKE FOOD, HOUSING, MEDICAL CARE, AND HEATING?: NOT HARD AT ALL

## 2023-08-29 SDOH — HEALTH STABILITY: PHYSICAL HEALTH: ON AVERAGE, HOW MANY MINUTES DO YOU ENGAGE IN EXERCISE AT THIS LEVEL?: 0 MIN

## 2023-08-29 ASSESSMENT — ENCOUNTER SYMPTOMS
COUGH: 0
RESPIRATORY NEGATIVE: 1
WHEEZING: 0
SHORTNESS OF BREATH: 0
BLOOD IN STOOL: 0

## 2023-08-29 ASSESSMENT — PATIENT HEALTH QUESTIONNAIRE - PHQ9
SUM OF ALL RESPONSES TO PHQ QUESTIONS 1-9: 0
1. LITTLE INTEREST OR PLEASURE IN DOING THINGS: 0
SUM OF ALL RESPONSES TO PHQ QUESTIONS 1-9: 0
2. FEELING DOWN, DEPRESSED OR HOPELESS: 0
SUM OF ALL RESPONSES TO PHQ QUESTIONS 1-9: 0
SUM OF ALL RESPONSES TO PHQ9 QUESTIONS 1 & 2: 0
SUM OF ALL RESPONSES TO PHQ QUESTIONS 1-9: 0

## 2023-08-29 ASSESSMENT — SOCIAL DETERMINANTS OF HEALTH (SDOH)

## 2023-08-29 NOTE — PROGRESS NOTES
per week   Vaping Use    Vaping Use: Never used   Substance and Sexual Activity    Alcohol use: Yes     Comment: occ    Drug use: Yes     Types: Marijuana Abdulkadir Inch)    Sexual activity: Not on file   Other Topics Concern    Not on file   Social History Narrative    Not on file     Social Determinants of Health     Financial Resource Strain: Low Risk     Difficulty of Paying Living Expenses: Not hard at all   Food Insecurity: No Food Insecurity    Worried About [place] in the Last Year: Never true    [address] in the Last Year: Never true   Transportation Needs: Unknown    Lack of Transportation (Medical): Not on file    Lack of Transportation (Non-Medical): No   Physical Activity: Inactive    Days of Exercise per Week: 0 days    Minutes of Exercise per Session: 0 min   Stress: Not on file   Social Connections: Not on file   Intimate Partner Violence: Not At Risk    Fear of Current or Ex-Partner: No    Emotionally Abused: No    Physically Abused: No    Sexually Abused: No   Housing Stability: Unknown    Unable to Pay for Housing in the Last Year: Not on file    Number of Places Lived in the Last Year: Not on file    Unstable Housing in the Last Year: No     No family history on file. No Known Allergies  Prior to Admission medications    Medication Sig Start Date End Date Taking? Authorizing Provider   amoxicillin-clavulanate (AUGMENTIN) 875-125 MG per tablet Take 1 tablet by mouth in the morning and at bedtime 8/26/23  Yes Historical Provider, MD   ofloxacin (FLOXIN) 0.3 % otic solution INSTILL 10 DROPS INTO RIGHT EAR ONCE DAILY FOR 7 DAYS. LAY ON SIDE FOR 10 MINUTES TO ALLOW DROPS TO ABSORB 8/27/23  Yes Historical Provider, MD   ibuprofen (ADVIL;MOTRIN) 800 MG tablet Take 1 tablet by mouth 2 times daily as needed for Pain 4/19/22  Yes Susana Barkley PA-C                I have reviewed the patient's medical history in detail and updated the computerized patient record.       OBJECTIVE    Vitals:

## 2023-10-11 PROBLEM — L02.411 ABSCESS OF AXILLA, RIGHT: Status: ACTIVE | Noted: 2023-10-11

## 2023-10-11 PROBLEM — L72.3 SEBACEOUS CYST: Status: ACTIVE | Noted: 2022-08-30

## 2023-10-11 PROBLEM — L73.2 HIDRADENITIS SUPPURATIVA: Status: ACTIVE | Noted: 2022-08-30

## 2023-10-11 RX ORDER — CLINDAMYCIN HYDROCHLORIDE 300 MG/1
300 CAPSULE ORAL EVERY 6 HOURS
COMMUNITY
Start: 2016-02-04

## 2023-10-11 RX ORDER — SYRING-NEEDL,DISP,INSUL,0.3 ML 29 G X1/2"
SYRINGE, EMPTY DISPOSABLE MISCELLANEOUS
COMMUNITY
Start: 2023-02-03

## 2023-10-11 RX ORDER — NAPROXEN 500 MG/1
500 TABLET ORAL 2 TIMES DAILY
COMMUNITY
Start: 2022-07-15

## 2023-10-11 RX ORDER — POLYETHYLENE GLYCOL 3350 17 G/17G
17 POWDER, FOR SOLUTION ORAL DAILY PRN
COMMUNITY
Start: 2023-02-03

## 2024-08-09 ENCOUNTER — HOSPITAL ENCOUNTER (EMERGENCY)
Facility: HOSPITAL | Age: 29
Discharge: HOME | End: 2024-08-09
Payer: COMMERCIAL

## 2024-08-09 ENCOUNTER — HOSPITAL ENCOUNTER (EMERGENCY)
Facility: HOSPITAL | Age: 29
Discharge: HOME | End: 2024-08-10
Attending: STUDENT IN AN ORGANIZED HEALTH CARE EDUCATION/TRAINING PROGRAM
Payer: COMMERCIAL

## 2024-08-09 VITALS
BODY MASS INDEX: 32.96 KG/M2 | WEIGHT: 210 LBS | TEMPERATURE: 97.5 F | DIASTOLIC BLOOD PRESSURE: 86 MMHG | RESPIRATION RATE: 16 BRPM | OXYGEN SATURATION: 99 % | HEIGHT: 67 IN | SYSTOLIC BLOOD PRESSURE: 129 MMHG | HEART RATE: 62 BPM

## 2024-08-09 DIAGNOSIS — L30.9 DERMATITIS OF FACE: Primary | ICD-10-CM

## 2024-08-09 DIAGNOSIS — A46 ERYSIPELAS: Primary | ICD-10-CM

## 2024-08-09 LAB
ALBUMIN SERPL BCP-MCNC: 4 G/DL (ref 3.4–5)
ALP SERPL-CCNC: 58 U/L (ref 33–110)
ALT SERPL W P-5'-P-CCNC: 61 U/L (ref 7–45)
ANION GAP SERPL CALC-SCNC: 10 MMOL/L (ref 10–20)
AST SERPL W P-5'-P-CCNC: 22 U/L (ref 9–39)
BASOPHILS # BLD AUTO: 0.02 X10*3/UL (ref 0–0.1)
BASOPHILS NFR BLD AUTO: 0.3 %
BILIRUB SERPL-MCNC: 0.4 MG/DL (ref 0–1.2)
BUN SERPL-MCNC: 11 MG/DL (ref 6–23)
CALCIUM SERPL-MCNC: 9.2 MG/DL (ref 8.6–10.3)
CHLORIDE SERPL-SCNC: 108 MMOL/L (ref 98–107)
CO2 SERPL-SCNC: 26 MMOL/L (ref 21–32)
CREAT SERPL-MCNC: 0.71 MG/DL (ref 0.5–1.05)
CRP SERPL-MCNC: 0.83 MG/DL
EGFRCR SERPLBLD CKD-EPI 2021: >90 ML/MIN/1.73M*2
EOSINOPHIL # BLD AUTO: 0.15 X10*3/UL (ref 0–0.7)
EOSINOPHIL NFR BLD AUTO: 1.9 %
ERYTHROCYTE [DISTWIDTH] IN BLOOD BY AUTOMATED COUNT: 12 % (ref 11.5–14.5)
GLUCOSE SERPL-MCNC: 91 MG/DL (ref 74–99)
HCT VFR BLD AUTO: 40.8 % (ref 36–46)
HGB BLD-MCNC: 13.7 G/DL (ref 12–16)
IMM GRANULOCYTES # BLD AUTO: 0.01 X10*3/UL (ref 0–0.7)
IMM GRANULOCYTES NFR BLD AUTO: 0.1 % (ref 0–0.9)
LACTATE SERPL-SCNC: 0.7 MMOL/L (ref 0.4–2)
LYMPHOCYTES # BLD AUTO: 2.16 X10*3/UL (ref 1.2–4.8)
LYMPHOCYTES NFR BLD AUTO: 27.2 %
MCH RBC QN AUTO: 29.1 PG (ref 26–34)
MCHC RBC AUTO-ENTMCNC: 33.6 G/DL (ref 32–36)
MCV RBC AUTO: 87 FL (ref 80–100)
MONOCYTES # BLD AUTO: 0.55 X10*3/UL (ref 0.1–1)
MONOCYTES NFR BLD AUTO: 6.9 %
NEUTROPHILS # BLD AUTO: 5.04 X10*3/UL (ref 1.2–7.7)
NEUTROPHILS NFR BLD AUTO: 63.6 %
NRBC BLD-RTO: 0 /100 WBCS (ref 0–0)
PLATELET # BLD AUTO: 208 X10*3/UL (ref 150–450)
POTASSIUM SERPL-SCNC: 3.8 MMOL/L (ref 3.5–5.3)
PROT SERPL-MCNC: 7.2 G/DL (ref 6.4–8.2)
RBC # BLD AUTO: 4.7 X10*6/UL (ref 4–5.2)
SODIUM SERPL-SCNC: 140 MMOL/L (ref 136–145)
WBC # BLD AUTO: 7.9 X10*3/UL (ref 4.4–11.3)

## 2024-08-09 PROCEDURE — 2500000004 HC RX 250 GENERAL PHARMACY W/ HCPCS (ALT 636 FOR OP/ED): Mod: JZ | Performed by: PHYSICIAN ASSISTANT

## 2024-08-09 PROCEDURE — 99284 EMERGENCY DEPT VISIT MOD MDM: CPT

## 2024-08-09 PROCEDURE — 36415 COLL VENOUS BLD VENIPUNCTURE: CPT | Performed by: PHYSICIAN ASSISTANT

## 2024-08-09 PROCEDURE — 85025 COMPLETE CBC W/AUTO DIFF WBC: CPT | Performed by: PHYSICIAN ASSISTANT

## 2024-08-09 PROCEDURE — 83605 ASSAY OF LACTIC ACID: CPT | Performed by: PHYSICIAN ASSISTANT

## 2024-08-09 PROCEDURE — 86140 C-REACTIVE PROTEIN: CPT | Performed by: PHYSICIAN ASSISTANT

## 2024-08-09 PROCEDURE — 96365 THER/PROPH/DIAG IV INF INIT: CPT

## 2024-08-09 PROCEDURE — 80053 COMPREHEN METABOLIC PANEL: CPT | Performed by: PHYSICIAN ASSISTANT

## 2024-08-09 RX ORDER — CLINDAMYCIN PHOSPHATE 600 MG/50ML
600 INJECTION, SOLUTION INTRAVENOUS ONCE
Status: COMPLETED | OUTPATIENT
Start: 2024-08-09 | End: 2024-08-09

## 2024-08-09 RX ORDER — CLINDAMYCIN HYDROCHLORIDE 300 MG/1
300 CAPSULE ORAL 4 TIMES DAILY
Qty: 40 CAPSULE | Refills: 0 | Status: SHIPPED | OUTPATIENT
Start: 2024-08-09 | End: 2024-08-19

## 2024-08-09 ASSESSMENT — PAIN DESCRIPTION - LOCATION
LOCATION: FACE
LOCATION: FACE

## 2024-08-09 ASSESSMENT — PAIN DESCRIPTION - ORIENTATION
ORIENTATION: RIGHT
ORIENTATION: RIGHT

## 2024-08-09 ASSESSMENT — COLUMBIA-SUICIDE SEVERITY RATING SCALE - C-SSRS
2. HAVE YOU ACTUALLY HAD ANY THOUGHTS OF KILLING YOURSELF?: NO
2. HAVE YOU ACTUALLY HAD ANY THOUGHTS OF KILLING YOURSELF?: NO
1. IN THE PAST MONTH, HAVE YOU WISHED YOU WERE DEAD OR WISHED YOU COULD GO TO SLEEP AND NOT WAKE UP?: NO
6. HAVE YOU EVER DONE ANYTHING, STARTED TO DO ANYTHING, OR PREPARED TO DO ANYTHING TO END YOUR LIFE?: NO
1. IN THE PAST MONTH, HAVE YOU WISHED YOU WERE DEAD OR WISHED YOU COULD GO TO SLEEP AND NOT WAKE UP?: NO
6. HAVE YOU EVER DONE ANYTHING, STARTED TO DO ANYTHING, OR PREPARED TO DO ANYTHING TO END YOUR LIFE?: NO

## 2024-08-09 ASSESSMENT — PAIN DESCRIPTION - FREQUENCY: FREQUENCY: CONSTANT/CONTINUOUS

## 2024-08-09 ASSESSMENT — LIFESTYLE VARIABLES
TOTAL SCORE: 0
EVER HAD A DRINK FIRST THING IN THE MORNING TO STEADY YOUR NERVES TO GET RID OF A HANGOVER: NO
HAVE PEOPLE ANNOYED YOU BY CRITICIZING YOUR DRINKING: NO
HAVE YOU EVER FELT YOU SHOULD CUT DOWN ON YOUR DRINKING: NO
EVER FELT BAD OR GUILTY ABOUT YOUR DRINKING: NO

## 2024-08-09 ASSESSMENT — PAIN - FUNCTIONAL ASSESSMENT
PAIN_FUNCTIONAL_ASSESSMENT: 0-10
PAIN_FUNCTIONAL_ASSESSMENT: 0-10

## 2024-08-09 ASSESSMENT — PAIN SCALES - GENERAL
PAINLEVEL_OUTOF10: 2
PAINLEVEL_OUTOF10: 3

## 2024-08-09 ASSESSMENT — PAIN DESCRIPTION - DESCRIPTORS: DESCRIPTORS: PRESSURE

## 2024-08-09 ASSESSMENT — PAIN DESCRIPTION - PAIN TYPE
TYPE: ACUTE PAIN
TYPE: ACUTE PAIN

## 2024-08-09 NOTE — ED PROVIDER NOTES
HPI   Chief Complaint   Patient presents with   • Insect Bite     Pt was diagnosed with bug bite on right side of face and treated at an urgent care. Woke up this morning with the swelling and redness worse than yesterday.        28-year-old female presents to the emergency department for redness and swelling of the right side of her face.  States she woke up with it yesterday.  It is painful, not itchy and she went to urgent care and they told her she probably had a bug bite and put her on 10 mg of prednisone and Benadryl.  She states that the redness has improved but the swelling has not and the pain has not.  She denies fevers or flulike symptoms, body aches and chills, vomiting.  Of note, she states she has Hydro nidus suppurativa and has had MRSA in the past and just had a draining abscess in her right axillary area.  She has not been on antibiotics for it.  She is not diabetic.              Patient History   Past Medical History:   Diagnosis Date   • Encounter for examination of eyes and vision without abnormal findings     Normal eye and vision exam   • Personal history of other diseases of the respiratory system     History of asthma   • Personal history of other infectious and parasitic diseases     History of varicella     Past Surgical History:   Procedure Laterality Date   • INCISION AND DRAINAGE OF WOUND  04/21/2016    Incision And Drainage Of Skin Abscess     No family history on file.  Social History     Tobacco Use   • Smoking status: Not on file   • Smokeless tobacco: Not on file   Substance Use Topics   • Alcohol use: Not on file   • Drug use: Not on file       Physical Exam   ED Triage Vitals [08/09/24 0953]   Temperature Heart Rate Respirations BP   36.5 °C (97.7 °F) 68 16 (!) 153/93      Pulse Ox Temp Source Heart Rate Source Patient Position   98 % Temporal -- Sitting      BP Location FiO2 (%)     Right arm --       Physical Exam  Vitals and nursing note reviewed.   Constitutional:        General: She is not in acute distress.  HENT:      Head:      Comments: Erythematous, hot, tender, well-demarcated, edematous area of the right cheek extending up under the eye and down to the jaw, sparing the nose, mouth and any area above the eye.  The ear and the neck are not involved.     Mouth/Throat:      Mouth: Mucous membranes are moist.      Pharynx: Oropharynx is clear.   Eyes:      Extraocular Movements: Extraocular movements intact.      Conjunctiva/sclera: Conjunctivae normal.      Pupils: Pupils are equal, round, and reactive to light.   Cardiovascular:      Rate and Rhythm: Normal rate and regular rhythm.      Pulses: Normal pulses.   Pulmonary:      Effort: Pulmonary effort is normal. No respiratory distress.      Breath sounds: Normal breath sounds.   Abdominal:      General: There is no distension.      Palpations: Abdomen is soft.      Tenderness: There is no abdominal tenderness. There is no guarding or rebound.   Musculoskeletal:         General: No deformity.      Cervical back: Neck supple.   Skin:     General: Skin is warm and dry.   Neurological:      Mental Status: She is alert and oriented to person, place, and time. Mental status is at baseline.      Cranial Nerves: No cranial nerve deficit.      Sensory: No sensory deficit.      Motor: No weakness.   Psychiatric:         Mood and Affect: Mood normal.         Behavior: Behavior normal.           ED Course & MDM   Diagnoses as of 08/09/24 1137   Erysipelas                 No data recorded                                 Medical Decision Making  28-year-old female presents to the emergency department for redness and swelling of the right side of her face, unprovoked since yesterday.  On my exam, she has a well-demarcated erythematous, edematous area of the right side of her face which is hot and tender to palpation.  It is more concerning for erysipelas or cellulitis than a bug bite.  There are no lesions that look like a bite or an  inciting lesion.  My high suspicion is for erysipelas however, patient has a history of MRSA and a recent draining abscess in her right axilla so I do feel that covering MRSA is appropriate.  I gave her a dose of IV clindamycin here.  Patient does not have a leukocytosis, left shift, elevated CRP, elevated lactic acid.  Will treat the patient at home with oral clindamycin.  Discussed with her that she can continue Benadryl if she has any itching but she should DC the prednisone for now.  Recommended close follow-up with primary care.  Discussed results with patient and/or family/friend and recommended close follow up with primary care or specialist.  Reviewed return precautions at length.  I answered all questions.           Procedure  Procedures     Melissa Rust PA-C  08/09/24 1136       Melissa Rust PA-C  08/09/24 1138

## 2024-08-09 NOTE — Clinical Note
Thao Mcclure was seen and treated in our emergency department on 8/9/2024.  She may return to work on 08/12/2024.       If you have any questions or concerns, please don't hesitate to call.      Melissa Rust PA-C

## 2024-08-09 NOTE — Clinical Note
Thao Mcclure was seen and treated in our emergency department on 8/9/2024.  She may return to work on 08/13/2024.       If you have any questions or concerns, please don't hesitate to call.      David Zaidi MD

## 2024-08-10 ENCOUNTER — APPOINTMENT (OUTPATIENT)
Dept: RADIOLOGY | Facility: HOSPITAL | Age: 29
End: 2024-08-10
Payer: COMMERCIAL

## 2024-08-10 VITALS
HEART RATE: 63 BPM | TEMPERATURE: 97.2 F | DIASTOLIC BLOOD PRESSURE: 80 MMHG | OXYGEN SATURATION: 99 % | RESPIRATION RATE: 16 BRPM | SYSTOLIC BLOOD PRESSURE: 133 MMHG | BODY MASS INDEX: 32.96 KG/M2 | WEIGHT: 210 LBS | HEIGHT: 67 IN

## 2024-08-10 LAB
ALBUMIN SERPL BCP-MCNC: 3.5 G/DL (ref 3.4–5)
ALP SERPL-CCNC: 60 U/L (ref 33–110)
ALT SERPL W P-5'-P-CCNC: 51 U/L (ref 7–45)
ANION GAP SERPL CALC-SCNC: 9 MMOL/L (ref 10–20)
AST SERPL W P-5'-P-CCNC: 20 U/L (ref 9–39)
B-HCG SERPL-ACNC: <2 MIU/ML
BASOPHILS # BLD AUTO: 0.03 X10*3/UL (ref 0–0.1)
BASOPHILS NFR BLD AUTO: 0.4 %
BILIRUB SERPL-MCNC: 0.3 MG/DL (ref 0–1.2)
BUN SERPL-MCNC: 14 MG/DL (ref 6–23)
CALCIUM SERPL-MCNC: 8.1 MG/DL (ref 8.6–10.3)
CHLORIDE SERPL-SCNC: 109 MMOL/L (ref 98–107)
CO2 SERPL-SCNC: 25 MMOL/L (ref 21–32)
CREAT SERPL-MCNC: 0.82 MG/DL (ref 0.5–1.05)
CRP SERPL-MCNC: 0.54 MG/DL
EGFRCR SERPLBLD CKD-EPI 2021: >90 ML/MIN/1.73M*2
EOSINOPHIL # BLD AUTO: 0.28 X10*3/UL (ref 0–0.7)
EOSINOPHIL NFR BLD AUTO: 4.1 %
ERYTHROCYTE [DISTWIDTH] IN BLOOD BY AUTOMATED COUNT: 12.2 % (ref 11.5–14.5)
ERYTHROCYTE [SEDIMENTATION RATE] IN BLOOD BY WESTERGREN METHOD: 6 MM/H (ref 0–20)
GLUCOSE SERPL-MCNC: 101 MG/DL (ref 74–99)
HCT VFR BLD AUTO: 37.4 % (ref 36–46)
HGB BLD-MCNC: 12.5 G/DL (ref 12–16)
IMM GRANULOCYTES # BLD AUTO: 0.01 X10*3/UL (ref 0–0.7)
IMM GRANULOCYTES NFR BLD AUTO: 0.1 % (ref 0–0.9)
LYMPHOCYTES # BLD AUTO: 2.73 X10*3/UL (ref 1.2–4.8)
LYMPHOCYTES NFR BLD AUTO: 40.4 %
MCH RBC QN AUTO: 29.4 PG (ref 26–34)
MCHC RBC AUTO-ENTMCNC: 33.4 G/DL (ref 32–36)
MCV RBC AUTO: 88 FL (ref 80–100)
MONOCYTES # BLD AUTO: 0.46 X10*3/UL (ref 0.1–1)
MONOCYTES NFR BLD AUTO: 6.8 %
NEUTROPHILS # BLD AUTO: 3.24 X10*3/UL (ref 1.2–7.7)
NEUTROPHILS NFR BLD AUTO: 48.2 %
NRBC BLD-RTO: 0 /100 WBCS (ref 0–0)
PLATELET # BLD AUTO: 187 X10*3/UL (ref 150–450)
POTASSIUM SERPL-SCNC: 3.3 MMOL/L (ref 3.5–5.3)
PROT SERPL-MCNC: 6.4 G/DL (ref 6.4–8.2)
RBC # BLD AUTO: 4.25 X10*6/UL (ref 4–5.2)
SODIUM SERPL-SCNC: 140 MMOL/L (ref 136–145)
WBC # BLD AUTO: 6.8 X10*3/UL (ref 4.4–11.3)

## 2024-08-10 PROCEDURE — 96374 THER/PROPH/DIAG INJ IV PUSH: CPT

## 2024-08-10 PROCEDURE — 36415 COLL VENOUS BLD VENIPUNCTURE: CPT | Performed by: PHYSICIAN ASSISTANT

## 2024-08-10 PROCEDURE — 2550000001 HC RX 255 CONTRASTS: Performed by: STUDENT IN AN ORGANIZED HEALTH CARE EDUCATION/TRAINING PROGRAM

## 2024-08-10 PROCEDURE — 96361 HYDRATE IV INFUSION ADD-ON: CPT

## 2024-08-10 PROCEDURE — 85652 RBC SED RATE AUTOMATED: CPT | Performed by: PHYSICIAN ASSISTANT

## 2024-08-10 PROCEDURE — 2500000004 HC RX 250 GENERAL PHARMACY W/ HCPCS (ALT 636 FOR OP/ED): Performed by: PHYSICIAN ASSISTANT

## 2024-08-10 PROCEDURE — 70487 CT MAXILLOFACIAL W/DYE: CPT

## 2024-08-10 PROCEDURE — 84702 CHORIONIC GONADOTROPIN TEST: CPT | Performed by: STUDENT IN AN ORGANIZED HEALTH CARE EDUCATION/TRAINING PROGRAM

## 2024-08-10 PROCEDURE — 70487 CT MAXILLOFACIAL W/DYE: CPT | Performed by: RADIOLOGY

## 2024-08-10 PROCEDURE — 80053 COMPREHEN METABOLIC PANEL: CPT | Performed by: PHYSICIAN ASSISTANT

## 2024-08-10 PROCEDURE — 85025 COMPLETE CBC W/AUTO DIFF WBC: CPT | Performed by: PHYSICIAN ASSISTANT

## 2024-08-10 PROCEDURE — 86140 C-REACTIVE PROTEIN: CPT | Performed by: PHYSICIAN ASSISTANT

## 2024-08-10 RX ORDER — FENTANYL CITRATE 50 UG/ML
50 INJECTION, SOLUTION INTRAMUSCULAR; INTRAVENOUS ONCE
Status: DISCONTINUED | OUTPATIENT
Start: 2024-08-10 | End: 2024-08-10 | Stop reason: HOSPADM

## 2024-08-10 RX ORDER — PREDNISONE 10 MG/1
TABLET ORAL
Qty: 45 TABLET | Refills: 0 | Status: SHIPPED | OUTPATIENT
Start: 2024-08-10

## 2024-08-10 RX ORDER — DIPHENHYDRAMINE HCL 25 MG
25 TABLET ORAL EVERY 6 HOURS
Qty: 20 TABLET | Refills: 0 | Status: SHIPPED | OUTPATIENT
Start: 2024-08-10 | End: 2024-08-15

## 2024-08-10 RX ORDER — FAMOTIDINE 20 MG/1
20 TABLET, FILM COATED ORAL 2 TIMES DAILY
Qty: 30 TABLET | Refills: 0 | Status: SHIPPED | OUTPATIENT
Start: 2024-08-10 | End: 2024-08-25

## 2024-08-10 RX ORDER — ONDANSETRON HYDROCHLORIDE 2 MG/ML
4 INJECTION, SOLUTION INTRAVENOUS ONCE
Status: DISCONTINUED | OUTPATIENT
Start: 2024-08-10 | End: 2024-08-10 | Stop reason: HOSPADM

## 2024-08-10 RX ADMIN — IOHEXOL 75 ML: 350 INJECTION, SOLUTION INTRAVENOUS at 00:47

## 2024-08-10 RX ADMIN — DEXAMETHASONE SODIUM PHOSPHATE 20 MG: 10 INJECTION, SOLUTION INTRAMUSCULAR; INTRAVENOUS at 02:51

## 2024-08-10 RX ADMIN — SODIUM CHLORIDE 1000 ML: 9 INJECTION, SOLUTION INTRAVENOUS at 00:50

## 2024-08-10 ASSESSMENT — PATIENT HEALTH QUESTIONNAIRE - PHQ9
SUM OF ALL RESPONSES TO PHQ9 QUESTIONS 1 & 2: 0
SUM OF ALL RESPONSES TO PHQ QUESTIONS 1-9: 0
1. LITTLE INTEREST OR PLEASURE IN DOING THINGS: NOT AT ALL
SUM OF ALL RESPONSES TO PHQ QUESTIONS 1-9: 0
SUM OF ALL RESPONSES TO PHQ9 QUESTIONS 1 & 2: 0
2. FEELING DOWN, DEPRESSED OR HOPELESS: NOT AT ALL
2. FEELING DOWN, DEPRESSED OR HOPELESS: NOT AT ALL
1. LITTLE INTEREST OR PLEASURE IN DOING THINGS: NOT AT ALL
SUM OF ALL RESPONSES TO PHQ QUESTIONS 1-9: 0
SUM OF ALL RESPONSES TO PHQ QUESTIONS 1-9: 0

## 2024-08-10 NOTE — ED PROVIDER NOTES
HPI   Chief Complaint   Patient presents with   • Facial Swelling     Right side facial swelling and pain, warm to touch. Seen here earlier and told if it worsens to return, and she feels it is worse.       28-year-old female presents emergency room with chief complaint of worsening redness to the right side of the face.  Patient states she was seen here yesterday for rash to the face with swelling after she believes she was bitten by an insect.  Patient had lab work done which is unremarkable and she was discharged home with prescription for Benadryl and clindamycin.  Patient states she took 2 doses and the swelling has increased.  She states the rash is itchy and is now spreading to her ear.  She denies any fevers, chills, sore throat, cough, nausea or vomiting.      History provided by:  Patient and medical records          Patient History   Past Medical History:   Diagnosis Date   • Encounter for examination of eyes and vision without abnormal findings     Normal eye and vision exam   • Personal history of other diseases of the respiratory system     History of asthma   • Personal history of other infectious and parasitic diseases     History of varicella     Past Surgical History:   Procedure Laterality Date   • INCISION AND DRAINAGE OF WOUND  04/21/2016    Incision And Drainage Of Skin Abscess     No family history on file.  Social History     Tobacco Use   • Smoking status: Not on file   • Smokeless tobacco: Not on file   Substance Use Topics   • Alcohol use: Not on file   • Drug use: Not on file       Physical Exam   ED Triage Vitals [08/09/24 2319]   Temperature Heart Rate Respirations BP   36.2 °C (97.2 °F) 73 16 128/82      Pulse Ox Temp Source Heart Rate Source Patient Position   98 % Temporal Monitor Sitting      BP Location FiO2 (%)     Right arm --       Physical Exam  Vitals and nursing note reviewed.   Constitutional:       Appearance: Normal appearance. She is normal weight.   HENT:      Head:  Normocephalic and atraumatic.      Right Ear: Tympanic membrane, ear canal and external ear normal.      Left Ear: Tympanic membrane, ear canal and external ear normal.      Nose: Nose normal.      Mouth/Throat:      Mouth: Mucous membranes are moist.      Pharynx: Oropharynx is clear.   Eyes:      Extraocular Movements: Extraocular movements intact.      Conjunctiva/sclera: Conjunctivae normal.      Pupils: Pupils are equal, round, and reactive to light.   Cardiovascular:      Rate and Rhythm: Normal rate and regular rhythm.      Pulses: Normal pulses.      Heart sounds: Normal heart sounds.   Pulmonary:      Effort: Pulmonary effort is normal.      Breath sounds: Normal breath sounds. No wheezing, rhonchi or rales.   Abdominal:      General: Abdomen is flat. Bowel sounds are normal.      Palpations: Abdomen is soft. There is no mass.      Tenderness: There is no abdominal tenderness. There is no guarding.   Musculoskeletal:         General: No swelling or tenderness. Normal range of motion.      Cervical back: Normal range of motion and neck supple.   Lymphadenopathy:      Head:      Right side of head: No submental, submandibular, tonsillar, preauricular, posterior auricular or occipital adenopathy.      Left side of head: No submental, submandibular, tonsillar, preauricular, posterior auricular or occipital adenopathy.      Cervical: No cervical adenopathy.   Skin:     General: Skin is warm and dry.      Capillary Refill: Capillary refill takes less than 2 seconds.      Findings: Erythema present. No rash.             Comments: Erythema to the right side of the face cheek and right ear.   Neurological:      General: No focal deficit present.      Mental Status: She is alert and oriented to person, place, and time. Mental status is at baseline.      GCS: GCS eye subscore is 4. GCS verbal subscore is 5. GCS motor subscore is 6.      Sensory: Sensation is intact.      Motor: Motor function is intact.   Psychiatric:          Attention and Perception: Attention and perception normal.         Mood and Affect: Mood and affect normal.         Speech: Speech normal.         Behavior: Behavior normal. Behavior is cooperative.         Thought Content: Thought content normal.         Judgment: Judgment normal.           ED Course & MDM   Diagnoses as of 08/10/24 0225   Dermatitis of face                 No data recorded     [place] Coma Scale Score: 15 (08/09/24 2318 : Mallory Deutsch RN)                           Medical Decision Making  Patient was given 50 mcg of fentanyl IV push, Zofran 4 mg IV push and 1 L of normal saline, lab work and a CT scan of face with IV contrast.    CBC white count 6.8 hemoglobin 12.5 Franco crit 37.4 platelet count 187, metabolic potassium 3.3 chloride 109 anion gap of 9 glucose 101, sed rate 6 CRP 0.54 beta quant negative.  CT scan of facial bone with IV contrast shows soft tissue swelling adjacent to right mandible maxilla with underlying dental disease no defined abscess noted.  At this point time it does not appear to be cellulitic but appears to be more allergic reaction.  Patient was recently hiking in [facility] suspicious of a contact dermatitis.  The patient was discharged home with prescription for Benadryl, Pepcid, prednisone taper.  She was advised to continue with the antibiotics.  I do not see any evidence to suggest an abscess.  Her oropharynx is clear no dental tenderness.  She was discharged home she was encouraged to return back with any concerns or worsening of symptoms  /82 heart rate 97 respirations 16 pulse ox is 90% on room air          Procedure  Procedures     Bakari Epperson PA-C  08/10/24 0227

## 2024-08-13 ENCOUNTER — OFFICE VISIT (OUTPATIENT)
Dept: FAMILY MEDICINE CLINIC | Age: 29
End: 2024-08-13
Payer: COMMERCIAL

## 2024-08-13 VITALS
TEMPERATURE: 98.1 F | DIASTOLIC BLOOD PRESSURE: 70 MMHG | WEIGHT: 218 LBS | HEART RATE: 72 BPM | OXYGEN SATURATION: 98 % | HEIGHT: 67 IN | BODY MASS INDEX: 34.21 KG/M2 | SYSTOLIC BLOOD PRESSURE: 120 MMHG

## 2024-08-13 DIAGNOSIS — L24.1 IRRITANT CONTACT DERMATITIS DUE TO OILS: Primary | ICD-10-CM

## 2024-08-13 PROCEDURE — G8427 DOCREV CUR MEDS BY ELIG CLIN: HCPCS | Performed by: NURSE PRACTITIONER

## 2024-08-13 PROCEDURE — 4004F PT TOBACCO SCREEN RCVD TLK: CPT | Performed by: NURSE PRACTITIONER

## 2024-08-13 PROCEDURE — G8417 CALC BMI ABV UP PARAM F/U: HCPCS | Performed by: NURSE PRACTITIONER

## 2024-08-13 PROCEDURE — 99214 OFFICE O/P EST MOD 30 MIN: CPT | Performed by: NURSE PRACTITIONER

## 2024-08-13 RX ORDER — DIPHENHYDRAMINE HCL 25 MG/1
25 TABLET ORAL EVERY 6 HOURS PRN
COMMUNITY
Start: 2024-08-10

## 2024-08-13 RX ORDER — CLINDAMYCIN HYDROCHLORIDE 300 MG/1
300 CAPSULE ORAL 4 TIMES DAILY
COMMUNITY
Start: 2024-08-09 | End: 2024-08-19

## 2024-08-13 RX ORDER — TESTOSTERONE CYPIONATE 200 MG/ML
8 INJECTION INTRAMUSCULAR ONCE
Status: SHIPPED | OUTPATIENT
Start: 2024-08-13

## 2024-08-13 RX ORDER — PREDNISONE 10 MG/1
TABLET ORAL
COMMUNITY
Start: 2024-08-10

## 2024-08-13 RX ORDER — METHYLPREDNISOLONE ACETATE 80 MG/ML
80 INJECTION, SUSPENSION INTRA-ARTICULAR; INTRALESIONAL; INTRAMUSCULAR; SOFT TISSUE ONCE
Status: COMPLETED | OUTPATIENT
Start: 2024-08-13 | End: 2024-08-13

## 2024-08-13 RX ORDER — FAMOTIDINE 20 MG/1
20 TABLET, FILM COATED ORAL 2 TIMES DAILY
COMMUNITY
Start: 2024-08-10 | End: 2024-08-25

## 2024-08-13 RX ORDER — DEXAMETHASONE SODIUM PHOSPHATE 10 MG/ML
8 INJECTION INTRAMUSCULAR; INTRAVENOUS ONCE
Status: COMPLETED | OUTPATIENT
Start: 2024-08-13 | End: 2024-08-13

## 2024-08-13 RX ADMIN — DEXAMETHASONE SODIUM PHOSPHATE 8 MG: 10 INJECTION INTRAMUSCULAR; INTRAVENOUS at 14:00

## 2024-08-13 RX ADMIN — METHYLPREDNISOLONE ACETATE 80 MG: 80 INJECTION, SUSPENSION INTRA-ARTICULAR; INTRALESIONAL; INTRAMUSCULAR; SOFT TISSUE at 14:00

## 2024-08-13 ASSESSMENT — ENCOUNTER SYMPTOMS
SHORTNESS OF BREATH: 0
BLOOD IN STOOL: 0
RESPIRATORY NEGATIVE: 1
WHEEZING: 0
COUGH: 0

## 2024-08-13 NOTE — PROGRESS NOTES
No decreased breath sounds, wheezing, rhonchi or rales.   Musculoskeletal:         General: No tenderness or edema.      Cervical back: Normal range of motion.   Skin:     General: Skin is warm and dry.      Findings: Rash present. Rash is papular and purpuric. Rash is not crusting or scaling.             Comments: erythematous papulovescular rash in clusters  over right sheek of face as well as right upper chest and neck     Neurological:      General: No focal deficit present.      Mental Status: She is alert and oriented to person, place, and time.               ASSESSMENT/ PLAN    1. Irritant contact dermatitis due to oils  8/80 in office. Continue pepcid, benadryl, clinda, and pred taper.   - methylPREDNISolone acetate (DEPO-MEDROL) injection 80 mg  - dexAMETHasone Sodium Phosphate injection 8 mg              On this date 8/13/2024 I have spent 30 minutes reviewing previous notes, test results and face to face with the patient discussing the diagnosis and importance of compliance with the treatment plan as well as documenting on the day of the visit.    Return if symptoms worsen or fail to improve.     Electronically signed by:  MARCELINA Stacy - LEON   8/13/24

## 2024-10-15 ENCOUNTER — OFFICE VISIT (OUTPATIENT)
Dept: FAMILY MEDICINE CLINIC | Age: 29
End: 2024-10-15
Payer: COMMERCIAL

## 2024-10-15 VITALS
OXYGEN SATURATION: 97 % | WEIGHT: 214 LBS | SYSTOLIC BLOOD PRESSURE: 122 MMHG | DIASTOLIC BLOOD PRESSURE: 68 MMHG | HEIGHT: 67 IN | BODY MASS INDEX: 33.59 KG/M2 | TEMPERATURE: 98.1 F | HEART RATE: 87 BPM

## 2024-10-15 DIAGNOSIS — J06.9 VIRAL URI: Primary | ICD-10-CM

## 2024-10-15 PROCEDURE — G8484 FLU IMMUNIZE NO ADMIN: HCPCS | Performed by: NURSE PRACTITIONER

## 2024-10-15 PROCEDURE — G8417 CALC BMI ABV UP PARAM F/U: HCPCS | Performed by: NURSE PRACTITIONER

## 2024-10-15 PROCEDURE — 4004F PT TOBACCO SCREEN RCVD TLK: CPT | Performed by: NURSE PRACTITIONER

## 2024-10-15 PROCEDURE — G8427 DOCREV CUR MEDS BY ELIG CLIN: HCPCS | Performed by: NURSE PRACTITIONER

## 2024-10-15 PROCEDURE — 99214 OFFICE O/P EST MOD 30 MIN: CPT | Performed by: NURSE PRACTITIONER

## 2024-10-15 RX ORDER — FLUTICASONE PROPIONATE 50 MCG
2 SPRAY, SUSPENSION (ML) NASAL DAILY
Qty: 16 G | Refills: 0 | Status: SHIPPED | OUTPATIENT
Start: 2024-10-15

## 2024-10-15 SDOH — ECONOMIC STABILITY: FOOD INSECURITY: WITHIN THE PAST 12 MONTHS, YOU WORRIED THAT YOUR FOOD WOULD RUN OUT BEFORE YOU GOT MONEY TO BUY MORE.: NEVER TRUE

## 2024-10-15 SDOH — ECONOMIC STABILITY: INCOME INSECURITY: HOW HARD IS IT FOR YOU TO PAY FOR THE VERY BASICS LIKE FOOD, HOUSING, MEDICAL CARE, AND HEATING?: NOT HARD AT ALL

## 2024-10-15 SDOH — ECONOMIC STABILITY: FOOD INSECURITY: WITHIN THE PAST 12 MONTHS, THE FOOD YOU BOUGHT JUST DIDN'T LAST AND YOU DIDN'T HAVE MONEY TO GET MORE.: NEVER TRUE

## 2024-10-15 ASSESSMENT — ENCOUNTER SYMPTOMS
WHEEZING: 0
RHINORRHEA: 1
SINUS PAIN: 1
BLOOD IN STOOL: 0
COUGH: 1

## 2024-10-15 NOTE — PROGRESS NOTES
the patient's medical history in detail and updated the computerized patient record.      OBJECTIVE    Vitals:    10/15/24 1131   BP: 122/68   Pulse: 87   Temp: 98.1 °F (36.7 °C)   TempSrc: Temporal   SpO2: 97%   Weight: 97.1 kg (214 lb)   Height: 1.702 m (5' 7\")       Physical Exam  Constitutional:       Appearance: Normal appearance.   HENT:      Head: Normocephalic.   Cardiovascular:      Rate and Rhythm: Normal rate.   Pulmonary:      Effort: Pulmonary effort is normal. No respiratory distress.   Skin:     General: Skin is warm and dry.   Neurological:      General: No focal deficit present.      Mental Status: She is alert and oriented to person, place, and time.               ASSESSMENT/ PLAN    1. Viral URI  Added flonase. More than likely viral. Was given ATB yesterday at walk in. May take it as prescribed however discussed more than likely viral in etiology.   - fluticasone (FLONASE) 50 MCG/ACT nasal spray; 2 sprays by Each Nostril route daily  Dispense: 16 g; Refill: 0              On this date 10/15/2024 I have spent 30 minutes reviewing previous notes, test results and face to face with the patient discussing the diagnosis and importance of compliance with the treatment plan as well as documenting on the day of the visit.    Return if symptoms worsen or fail to improve.     Electronically signed by:  MARCELINA Stacy CNP   10/15/24

## 2024-11-06 ENCOUNTER — OFFICE VISIT (OUTPATIENT)
Dept: FAMILY MEDICINE CLINIC | Age: 29
End: 2024-11-06

## 2024-11-06 VITALS
BODY MASS INDEX: 32.65 KG/M2 | OXYGEN SATURATION: 98 % | DIASTOLIC BLOOD PRESSURE: 60 MMHG | WEIGHT: 208 LBS | SYSTOLIC BLOOD PRESSURE: 122 MMHG | TEMPERATURE: 97.8 F | HEIGHT: 67 IN | HEART RATE: 73 BPM

## 2024-11-06 DIAGNOSIS — Z72.51 UNPROTECTED SEX: Primary | ICD-10-CM

## 2024-11-06 DIAGNOSIS — Z12.4 SCREENING FOR CERVICAL CANCER: ICD-10-CM

## 2024-11-06 LAB — HPV16+18+H RISK 12 DNA SPEC-IMP: NORMAL

## 2024-11-06 ASSESSMENT — ENCOUNTER SYMPTOMS
WHEEZING: 0
SHORTNESS OF BREATH: 0
COUGH: 0
RESPIRATORY NEGATIVE: 1
BLOOD IN STOOL: 0

## 2024-11-06 NOTE — PROGRESS NOTES
[facility]  [facility]  [address] [address]  Dept: [phone]  Dept Fax: [phone]  Loc: [phone]     ILIR Anguiano (: 1995) is a 29 y.o. female, Established patient, here for evaluation of the following chief complaint(s):  Gynecologic Exam (Pt has no concerns for today's visit/Denies any burning, itching, discharge)      PCP:  Lorrie Whittington, MARCELINA - CNP      HPI    Well Woman HPI  Patient here today for wellness exam. Last pap was normal and results were normal. Has no history of abnormal results in past. Reports bleeding is of normal flow and duration. LMP was last week, ended 3 days ago. Has 1 sex partner(s).Knows about self breast exams and does them monthly without any concerns.       Review of Systems   Constitutional: Negative.  Negative for fatigue.   Respiratory: Negative.  Negative for cough, shortness of breath and wheezing.    Cardiovascular: Negative.  Negative for chest pain, palpitations and leg swelling.   Gastrointestinal:  Negative for blood in stool.   Psychiatric/Behavioral: Negative.  Negative for behavioral problems. The patient is not nervous/anxious.        Past Medical History:   Diagnosis Date    Asthma      Past Surgical History:   Procedure Laterality Date    EAR SURGERY      EAR SURGERY      TONSILLECTOMY       Social History     Socioeconomic History    Marital status: Single     Spouse name: Not on file    Number of children: Not on file    Years of education: Not on file    Highest education level: Not on file   Occupational History    Not on file   Tobacco Use    Smoking status: Every Day     Current packs/day: 0.25     Types: Cigarettes    Smokeless tobacco: Never    Tobacco comments:     1 pack per week   Vaping Use    Vaping status: Never Used   Substance and Sexual Activity    Alcohol use: Yes     Comment: occ    Drug use: Yes

## 2024-11-07 LAB
CLUE CELLS VAG QL WET PREP: NORMAL
T VAGINALIS VAG QL WET PREP: NORMAL
TRICHOMONAS VAGINALIS SCREEN: NEGATIVE
YEAST VAG QL WET PREP: NORMAL

## 2024-11-08 LAB
C TRACH DNA CVX QL NAA+PROBE: NEGATIVE
N GONORRHOEA DNA CERV MUCUS QL NAA+PROBE: NEGATIVE

## 2024-11-11 DIAGNOSIS — J06.9 VIRAL URI: ICD-10-CM

## 2024-11-12 LAB
HPV HR 12 DNA SPEC QL NAA+PROBE: NOT DETECTED
HPV16 DNA SPEC QL NAA+PROBE: NOT DETECTED
HPV16+18+H RISK 12 DNA SPEC-IMP: NORMAL
HPV18 DNA SPEC QL NAA+PROBE: NOT DETECTED

## 2024-11-12 RX ORDER — FLUTICASONE PROPIONATE 50 MCG
2 SPRAY, SUSPENSION (ML) NASAL DAILY
Qty: 16 G | Refills: 0 | Status: SHIPPED | OUTPATIENT
Start: 2024-11-12

## 2024-11-12 NOTE — TELEPHONE ENCOUNTER
Past Visits    Date Provider Specialty Visit Type Primary Dx   11/06/2024 Lorrie Whittington APRN - CNP Family Medicine Office Visit Unprotected sex   10/15/2024 Lorrie Whittington APRN - CNP Family Medicine Office Visit Viral URI   08/13/2024 Lorrie Whittington, APRN - CNP Family Medicine Office Visit Irritant contact dermatitis due to oils   08/29/2023 Lorrie Whittington APRN - CNP Family Medicine Office Visit Recurrent acute suppurative otitis media with spontaneous rupture of both tympanic membranes   09/26/2022 Inocencio Vitale MD Primary Care Office Visit Physical exam, pre-employment

## 2024-11-22 ENCOUNTER — OFFICE VISIT (OUTPATIENT)
Dept: FAMILY MEDICINE CLINIC | Age: 29
End: 2024-11-22
Payer: COMMERCIAL

## 2024-11-22 VITALS
OXYGEN SATURATION: 98 % | DIASTOLIC BLOOD PRESSURE: 76 MMHG | WEIGHT: 208 LBS | BODY MASS INDEX: 32.65 KG/M2 | HEIGHT: 67 IN | HEART RATE: 66 BPM | TEMPERATURE: 97.1 F | SYSTOLIC BLOOD PRESSURE: 96 MMHG

## 2024-11-22 DIAGNOSIS — J30.2 SEASONAL ALLERGIC RHINITIS, UNSPECIFIED TRIGGER: Primary | ICD-10-CM

## 2024-11-22 PROCEDURE — 4004F PT TOBACCO SCREEN RCVD TLK: CPT | Performed by: NURSE PRACTITIONER

## 2024-11-22 PROCEDURE — G8427 DOCREV CUR MEDS BY ELIG CLIN: HCPCS | Performed by: NURSE PRACTITIONER

## 2024-11-22 PROCEDURE — G8484 FLU IMMUNIZE NO ADMIN: HCPCS | Performed by: NURSE PRACTITIONER

## 2024-11-22 PROCEDURE — 99213 OFFICE O/P EST LOW 20 MIN: CPT | Performed by: NURSE PRACTITIONER

## 2024-11-22 PROCEDURE — G8417 CALC BMI ABV UP PARAM F/U: HCPCS | Performed by: NURSE PRACTITIONER

## 2024-11-22 RX ORDER — PREDNISONE 20 MG/1
40 TABLET ORAL 2 TIMES DAILY
Qty: 20 TABLET | Refills: 0 | Status: SHIPPED | OUTPATIENT
Start: 2024-11-22 | End: 2024-11-27

## 2024-11-22 RX ORDER — CETIRIZINE HYDROCHLORIDE, PSEUDOEPHEDRINE HYDROCHLORIDE 5; 120 MG/1; MG/1
1 TABLET, FILM COATED, EXTENDED RELEASE ORAL 2 TIMES DAILY
Qty: 60 TABLET | Refills: 0 | Status: SHIPPED | OUTPATIENT
Start: 2024-11-22 | End: 2024-12-22

## 2024-11-22 ASSESSMENT — ENCOUNTER SYMPTOMS
SINUS PRESSURE: 0
DIARRHEA: 0
ABDOMINAL PAIN: 0
EYE PAIN: 0
EYE ITCHING: 1
SORE THROAT: 1
SHORTNESS OF BREATH: 0
COUGH: 1
SINUS PAIN: 0
NAUSEA: 0
TROUBLE SWALLOWING: 0
CHEST TIGHTNESS: 0
STRIDOR: 0
VOICE CHANGE: 0
EYE DISCHARGE: 0
EYE REDNESS: 0
WHEEZING: 0
VOMITING: 0
RHINORRHEA: 1

## 2024-11-22 NOTE — PROGRESS NOTES
Subjective:      Patient ID: Aracelis Anguiano is a 29 y.o. female who presents today for:  Chief Complaint   Patient presents with    Allergic Rhinitis      Pt states she has been dealing with allergies for about 1 month now.        HPI  Patient is here with c/o nasal congestion for a month.   Says she has sinus and ear pressure as well.   Says she Zyrtec daily.   Says she is using her Flonase.  Says she has some clear nasal drainage.  Says she has not taken any decongestants.   Says was on Augmentin and she reports that it did not help.   Past Medical History:   Diagnosis Date    Asthma      Past Surgical History:   Procedure Laterality Date    EAR SURGERY      EAR SURGERY      TONSILLECTOMY       Social History     Socioeconomic History    Marital status: Single     Spouse name: Not on file    Number of children: Not on file    Years of education: Not on file    Highest education level: Not on file   Occupational History    Not on file   Tobacco Use    Smoking status: Every Day     Current packs/day: 0.25     Types: Cigarettes    Smokeless tobacco: Never    Tobacco comments:     1 pack per week   Vaping Use    Vaping status: Never Used   Substance and Sexual Activity    Alcohol use: Yes     Comment: occ    Drug use: Yes     Types: Marijuana (Weed)    Sexual activity: Not on file   Other Topics Concern    Not on file   Social History Narrative    Not on file     Social Determinants of Health     Financial Resource Strain: Low Risk  (10/15/2024)    Overall Financial Resource Strain (CARDIA)     Difficulty of Paying Living Expenses: Not hard at all   Food Insecurity: No Food Insecurity (10/15/2024)    Hunger Vital Sign     Worried About Running Out of Food in the Last Year: Never true     Ran Out of Food in the Last Year: Never true   Transportation Needs: Unknown (10/15/2024)    PRAPARE - Transportation     Lack of Transportation (Medical): Not on file     Lack of Transportation (Non-Medical): No   Physical Activity:

## 2024-12-06 PROBLEM — Z12.4 CERVICAL CANCER SCREENING: Status: RESOLVED | Noted: 2024-11-06 | Resolved: 2024-12-06

## 2025-01-08 ENCOUNTER — OFFICE VISIT (OUTPATIENT)
Age: 30
End: 2025-01-08
Payer: COMMERCIAL

## 2025-01-08 VITALS
BODY MASS INDEX: 29.82 KG/M2 | HEART RATE: 86 BPM | RESPIRATION RATE: 16 BRPM | OXYGEN SATURATION: 99 % | DIASTOLIC BLOOD PRESSURE: 86 MMHG | TEMPERATURE: 97.7 F | WEIGHT: 190 LBS | SYSTOLIC BLOOD PRESSURE: 120 MMHG | HEIGHT: 67 IN

## 2025-01-08 DIAGNOSIS — J40 BRONCHITIS: Primary | ICD-10-CM

## 2025-01-08 PROCEDURE — 99213 OFFICE O/P EST LOW 20 MIN: CPT | Performed by: NURSE PRACTITIONER

## 2025-01-08 RX ORDER — PREDNISONE 20 MG/1
40 TABLET ORAL DAILY
Qty: 10 TABLET | Refills: 0 | Status: SHIPPED | OUTPATIENT
Start: 2025-01-08 | End: 2025-01-13

## 2025-01-08 RX ORDER — AZITHROMYCIN 500 MG/1
500 TABLET, FILM COATED ORAL DAILY
Qty: 3 TABLET | Refills: 0 | Status: SHIPPED | OUTPATIENT
Start: 2025-01-08 | End: 2025-01-11

## 2025-01-08 SDOH — ECONOMIC STABILITY: FOOD INSECURITY: WITHIN THE PAST 12 MONTHS, THE FOOD YOU BOUGHT JUST DIDN'T LAST AND YOU DIDN'T HAVE MONEY TO GET MORE.: NEVER TRUE

## 2025-01-08 SDOH — ECONOMIC STABILITY: FOOD INSECURITY: WITHIN THE PAST 12 MONTHS, YOU WORRIED THAT YOUR FOOD WOULD RUN OUT BEFORE YOU GOT MONEY TO BUY MORE.: NEVER TRUE

## 2025-01-08 ASSESSMENT — PATIENT HEALTH QUESTIONNAIRE - PHQ9
2. FEELING DOWN, DEPRESSED OR HOPELESS: SEVERAL DAYS
2. FEELING DOWN, DEPRESSED OR HOPELESS: SEVERAL DAYS
SUM OF ALL RESPONSES TO PHQ QUESTIONS 1-9: 2
1. LITTLE INTEREST OR PLEASURE IN DOING THINGS: SEVERAL DAYS
1. LITTLE INTEREST OR PLEASURE IN DOING THINGS: SEVERAL DAYS
SUM OF ALL RESPONSES TO PHQ9 QUESTIONS 1 & 2: 2
SUM OF ALL RESPONSES TO PHQ9 QUESTIONS 1 & 2: 2

## 2025-01-08 NOTE — PROGRESS NOTES
[facility]  [facility]  [address] [address]  Dept: [phone]  Dept Fax: [phone]  Loc: [phone]     Candida Hsu Diar, 29 y.o. female Established patient presents today with:  Chief Complaint   Patient presents with    Chest Congestion     Patient is here with c/o sinus congestion X November. She states now the congestion is now in her chest and is coughing at night. Patient has been using the Flonase and Zyrtec.        History of Present Illness  The patient presents for evaluation of bronchitis.    She reports persistent sinus-related symptoms, which she describes as having shifted in nature. She has not experienced any fevers. She is currently using Flonase and Zyrtec. She suspects a recent influenza infection approximately 2 weeks ago, during which she experienced severe body aches, sweating, vomiting, and inability to retain fluids or food for a duration of 24 hours. She also reports that her boyfriend is currently experiencing similar sinus-related symptoms. She has a known history of asthma, which heightens her concern when she experiences coughing.    MEDICATIONS  Flonase, Zyrtec      Past Medical History:   Diagnosis Date    Asthma      Past Surgical History:   Procedure Laterality Date    EAR SURGERY      EAR SURGERY      TONSILLECTOMY       Immunization History   Administered Date(s) Administered    DTaP 1997, 02/16/1998, 04/30/1998, 03/03/1999, 12/12/2002    HPV Quadrivalent (Gardasil) 11/12/2008, 01/27/2009, 12/15/2009    Hep B, ENGERIX-B, RECOMBIVAX-HB, (age Birth - 19y), IM, 0.5mL 1997, 02/16/1998, 08/13/1998    Hib PRP-T, ACTHIB (age 2m-5y, Adlt Risk), HIBERIX (age 6w-4y, Adlt Risk), IM, 0.5mL 1997, 02/16/1998, 04/30/1998, 03/03/1999    Influenza A ([id number]) Vaccine PF IM 11/05/2009    Influenza Virus Vaccine 11/25/2002, 10/08/2007, 11/12/2008,

## 2025-01-24 ENCOUNTER — HOSPITAL ENCOUNTER (EMERGENCY)
Facility: HOSPITAL | Age: 30
Discharge: HOME | End: 2025-01-24
Payer: COMMERCIAL

## 2025-01-24 VITALS
TEMPERATURE: 97.3 F | DIASTOLIC BLOOD PRESSURE: 82 MMHG | BODY MASS INDEX: 30.53 KG/M2 | HEART RATE: 92 BPM | WEIGHT: 190 LBS | OXYGEN SATURATION: 100 % | RESPIRATION RATE: 19 BRPM | SYSTOLIC BLOOD PRESSURE: 144 MMHG | HEIGHT: 66 IN

## 2025-01-24 DIAGNOSIS — N94.6 CRAMPY PAIN ASSOCIATED WITH MENSES: Primary | ICD-10-CM

## 2025-01-24 LAB
ABO GROUP (TYPE) IN BLOOD: NORMAL
ALBUMIN SERPL BCP-MCNC: 4.4 G/DL (ref 3.4–5)
ALP SERPL-CCNC: 59 U/L (ref 33–110)
ALT SERPL W P-5'-P-CCNC: 19 U/L (ref 7–45)
ANION GAP SERPL CALC-SCNC: 11 MMOL/L (ref 10–20)
APPEARANCE UR: ABNORMAL
AST SERPL W P-5'-P-CCNC: 12 U/L (ref 9–39)
B-HCG SERPL-ACNC: <2 MIU/ML
BASOPHILS # BLD AUTO: 0.03 X10*3/UL (ref 0–0.1)
BASOPHILS NFR BLD AUTO: 0.4 %
BILIRUB SERPL-MCNC: 0.4 MG/DL (ref 0–1.2)
BILIRUB UR STRIP.AUTO-MCNC: NEGATIVE MG/DL
BUN SERPL-MCNC: 9 MG/DL (ref 6–23)
CALCIUM SERPL-MCNC: 9.2 MG/DL (ref 8.6–10.3)
CHLORIDE SERPL-SCNC: 106 MMOL/L (ref 98–107)
CO2 SERPL-SCNC: 28 MMOL/L (ref 21–32)
COLOR UR: YELLOW
CREAT SERPL-MCNC: 0.72 MG/DL (ref 0.5–1.05)
EGFRCR SERPLBLD CKD-EPI 2021: >90 ML/MIN/1.73M*2
EOSINOPHIL # BLD AUTO: 0.16 X10*3/UL (ref 0–0.7)
EOSINOPHIL NFR BLD AUTO: 2.2 %
ERYTHROCYTE [DISTWIDTH] IN BLOOD BY AUTOMATED COUNT: 13 % (ref 11.5–14.5)
GLUCOSE SERPL-MCNC: 109 MG/DL (ref 74–99)
GLUCOSE UR STRIP.AUTO-MCNC: NORMAL MG/DL
HCG UR QL IA.RAPID: NEGATIVE
HCT VFR BLD AUTO: 40.1 % (ref 36–46)
HGB BLD-MCNC: 13.6 G/DL (ref 12–16)
IMM GRANULOCYTES # BLD AUTO: 0.01 X10*3/UL (ref 0–0.7)
IMM GRANULOCYTES NFR BLD AUTO: 0.1 % (ref 0–0.9)
KETONES UR STRIP.AUTO-MCNC: NEGATIVE MG/DL
LEUKOCYTE ESTERASE UR QL STRIP.AUTO: ABNORMAL
LYMPHOCYTES # BLD AUTO: 2.46 X10*3/UL (ref 1.2–4.8)
LYMPHOCYTES NFR BLD AUTO: 33.2 %
MAGNESIUM SERPL-MCNC: 2.06 MG/DL (ref 1.6–2.4)
MCH RBC QN AUTO: 29.5 PG (ref 26–34)
MCHC RBC AUTO-ENTMCNC: 33.9 G/DL (ref 32–36)
MCV RBC AUTO: 87 FL (ref 80–100)
MONOCYTES # BLD AUTO: 0.53 X10*3/UL (ref 0.1–1)
MONOCYTES NFR BLD AUTO: 7.1 %
MUCOUS THREADS #/AREA URNS AUTO: ABNORMAL /LPF
NEUTROPHILS # BLD AUTO: 4.23 X10*3/UL (ref 1.2–7.7)
NEUTROPHILS NFR BLD AUTO: 57 %
NITRITE UR QL STRIP.AUTO: NEGATIVE
NRBC BLD-RTO: 0 /100 WBCS (ref 0–0)
PH UR STRIP.AUTO: 6.5 [PH]
PLATELET # BLD AUTO: 212 X10*3/UL (ref 150–450)
POTASSIUM SERPL-SCNC: 3.6 MMOL/L (ref 3.5–5.3)
PROT SERPL-MCNC: 7.2 G/DL (ref 6.4–8.2)
PROT UR STRIP.AUTO-MCNC: ABNORMAL MG/DL
RBC # BLD AUTO: 4.61 X10*6/UL (ref 4–5.2)
RBC # UR STRIP.AUTO: ABNORMAL /UL
RBC #/AREA URNS AUTO: >20 /HPF
RH FACTOR (ANTIGEN D): NORMAL
SODIUM SERPL-SCNC: 141 MMOL/L (ref 136–145)
SP GR UR STRIP.AUTO: 1.03
SQUAMOUS #/AREA URNS AUTO: ABNORMAL /HPF
UROBILINOGEN UR STRIP.AUTO-MCNC: ABNORMAL MG/DL
WBC # BLD AUTO: 7.4 X10*3/UL (ref 4.4–11.3)
WBC #/AREA URNS AUTO: ABNORMAL /HPF

## 2025-01-24 PROCEDURE — 83735 ASSAY OF MAGNESIUM: CPT | Performed by: PHYSICIAN ASSISTANT

## 2025-01-24 PROCEDURE — 81001 URINALYSIS AUTO W/SCOPE: CPT | Performed by: PHYSICIAN ASSISTANT

## 2025-01-24 PROCEDURE — 80053 COMPREHEN METABOLIC PANEL: CPT | Performed by: PHYSICIAN ASSISTANT

## 2025-01-24 PROCEDURE — 84702 CHORIONIC GONADOTROPIN TEST: CPT | Performed by: PHYSICIAN ASSISTANT

## 2025-01-24 PROCEDURE — 81025 URINE PREGNANCY TEST: CPT | Performed by: PHYSICIAN ASSISTANT

## 2025-01-24 PROCEDURE — 36415 COLL VENOUS BLD VENIPUNCTURE: CPT | Performed by: PHYSICIAN ASSISTANT

## 2025-01-24 PROCEDURE — 87086 URINE CULTURE/COLONY COUNT: CPT | Mod: ELYLAB | Performed by: PHYSICIAN ASSISTANT

## 2025-01-24 PROCEDURE — 85025 COMPLETE CBC W/AUTO DIFF WBC: CPT | Performed by: PHYSICIAN ASSISTANT

## 2025-01-24 PROCEDURE — 86901 BLOOD TYPING SEROLOGIC RH(D): CPT | Performed by: PHYSICIAN ASSISTANT

## 2025-01-24 PROCEDURE — 99283 EMERGENCY DEPT VISIT LOW MDM: CPT

## 2025-01-24 ASSESSMENT — LIFESTYLE VARIABLES
TOTAL SCORE: 0
EVER HAD A DRINK FIRST THING IN THE MORNING TO STEADY YOUR NERVES TO GET RID OF A HANGOVER: NO
EVER FELT BAD OR GUILTY ABOUT YOUR DRINKING: NO
HAVE YOU EVER FELT YOU SHOULD CUT DOWN ON YOUR DRINKING: NO
HAVE PEOPLE ANNOYED YOU BY CRITICIZING YOUR DRINKING: NO

## 2025-01-24 ASSESSMENT — COLUMBIA-SUICIDE SEVERITY RATING SCALE - C-SSRS
6. HAVE YOU EVER DONE ANYTHING, STARTED TO DO ANYTHING, OR PREPARED TO DO ANYTHING TO END YOUR LIFE?: NO
1. IN THE PAST MONTH, HAVE YOU WISHED YOU WERE DEAD OR WISHED YOU COULD GO TO SLEEP AND NOT WAKE UP?: NO
2. HAVE YOU ACTUALLY HAD ANY THOUGHTS OF KILLING YOURSELF?: NO

## 2025-01-24 ASSESSMENT — PAIN SCALES - GENERAL: PAINLEVEL_OUTOF10: 1

## 2025-01-24 ASSESSMENT — PAIN - FUNCTIONAL ASSESSMENT: PAIN_FUNCTIONAL_ASSESSMENT: 0-10

## 2025-01-25 LAB — HOLD SPECIMEN: NORMAL

## 2025-01-26 LAB — BACTERIA UR CULT: NORMAL

## 2025-03-05 ENCOUNTER — TELEPHONE (OUTPATIENT)
Age: 30
End: 2025-03-05

## 2025-03-05 DIAGNOSIS — J30.2 SEASONAL ALLERGIC RHINITIS, UNSPECIFIED TRIGGER: ICD-10-CM

## 2025-03-05 NOTE — TELEPHONE ENCOUNTER
Patient's allergies are bad right now and Zyrtec-D is expensive to buy otc.      Rx requested:  Requested Prescriptions     Pending Prescriptions Disp Refills    cetirizine-psuedoephedrine (ZYRTEC-D) 5-120 MG per extended release tablet 60 tablet 0     Sig: Take 1 tablet by mouth 2 times daily         Last Office Visit:   1/8/2025      Next Visit Date:  No future appointments.

## 2025-03-05 NOTE — TELEPHONE ENCOUNTER
Patient has a one page medical statement that needs signed by provider stating she is up to date on her vaccines. She said provider signed one last year for her. Can she drop this off or does she need an appt?      Thank you.

## 2025-03-06 RX ORDER — CETIRIZINE HYDROCHLORIDE, PSEUDOEPHEDRINE HYDROCHLORIDE 5; 120 MG/1; MG/1
1 TABLET, FILM COATED, EXTENDED RELEASE ORAL 2 TIMES DAILY
Qty: 60 TABLET | Refills: 0 | Status: SHIPPED | OUTPATIENT
Start: 2025-03-06 | End: 2025-04-05

## 2025-03-06 NOTE — TELEPHONE ENCOUNTER
Tried calling pt but # is restricted.    Need to know what information form is asking for.  If its just Vx we can sign. If its asking questions about a physical pt needs to make appt (in office, for a physical)    [employer] msg sent.

## 2025-03-09 ENCOUNTER — HOSPITAL ENCOUNTER (EMERGENCY)
Facility: HOSPITAL | Age: 30
Discharge: HOME | End: 2025-03-09
Payer: COMMERCIAL

## 2025-03-09 VITALS
HEIGHT: 67 IN | DIASTOLIC BLOOD PRESSURE: 79 MMHG | WEIGHT: 190 LBS | SYSTOLIC BLOOD PRESSURE: 165 MMHG | RESPIRATION RATE: 17 BRPM | HEART RATE: 85 BPM | TEMPERATURE: 98.2 F | BODY MASS INDEX: 29.82 KG/M2

## 2025-03-09 DIAGNOSIS — Z32.01 POSITIVE PREGNANCY TEST (HHS-HCC): Primary | ICD-10-CM

## 2025-03-09 LAB
APPEARANCE UR: CLEAR
B-HCG SERPL-ACNC: ABNORMAL MIU/ML
BILIRUB UR STRIP.AUTO-MCNC: NEGATIVE MG/DL
COLOR UR: ABNORMAL
GLUCOSE UR STRIP.AUTO-MCNC: NORMAL MG/DL
HCG UR QL IA.RAPID: POSITIVE
HOLD SPECIMEN: NORMAL
HOLD SPECIMEN: NORMAL
KETONES UR STRIP.AUTO-MCNC: NEGATIVE MG/DL
LEUKOCYTE ESTERASE UR QL STRIP.AUTO: ABNORMAL
MUCOUS THREADS #/AREA URNS AUTO: NORMAL /LPF
NITRITE UR QL STRIP.AUTO: NEGATIVE
PH UR STRIP.AUTO: 6.5 [PH]
PROT UR STRIP.AUTO-MCNC: NEGATIVE MG/DL
RBC # UR STRIP.AUTO: NEGATIVE MG/DL
RBC #/AREA URNS AUTO: NORMAL /HPF
SP GR UR STRIP.AUTO: 1.01
SQUAMOUS #/AREA URNS AUTO: NORMAL /HPF
UROBILINOGEN UR STRIP.AUTO-MCNC: NORMAL MG/DL
WBC #/AREA URNS AUTO: NORMAL /HPF

## 2025-03-09 PROCEDURE — 87086 URINE CULTURE/COLONY COUNT: CPT | Mod: ELYLAB | Performed by: PHYSICIAN ASSISTANT

## 2025-03-09 PROCEDURE — 84702 CHORIONIC GONADOTROPIN TEST: CPT | Performed by: PHYSICIAN ASSISTANT

## 2025-03-09 PROCEDURE — 99283 EMERGENCY DEPT VISIT LOW MDM: CPT

## 2025-03-09 PROCEDURE — 36415 COLL VENOUS BLD VENIPUNCTURE: CPT | Performed by: PHYSICIAN ASSISTANT

## 2025-03-09 PROCEDURE — 81025 URINE PREGNANCY TEST: CPT | Performed by: PHYSICIAN ASSISTANT

## 2025-03-09 PROCEDURE — 81001 URINALYSIS AUTO W/SCOPE: CPT | Performed by: PHYSICIAN ASSISTANT

## 2025-03-09 ASSESSMENT — LIFESTYLE VARIABLES
HAVE PEOPLE ANNOYED YOU BY CRITICIZING YOUR DRINKING: NO
TOTAL SCORE: 0
EVER HAD A DRINK FIRST THING IN THE MORNING TO STEADY YOUR NERVES TO GET RID OF A HANGOVER: NO
EVER FELT BAD OR GUILTY ABOUT YOUR DRINKING: NO
HAVE YOU EVER FELT YOU SHOULD CUT DOWN ON YOUR DRINKING: NO

## 2025-03-09 ASSESSMENT — PAIN - FUNCTIONAL ASSESSMENT: PAIN_FUNCTIONAL_ASSESSMENT: 0-10

## 2025-03-09 ASSESSMENT — PAIN SCALES - GENERAL: PAINLEVEL_OUTOF10: 0 - NO PAIN

## 2025-03-10 LAB — HOLD SPECIMEN: NORMAL

## 2025-03-10 NOTE — ED PROVIDER NOTES
"HPI   Chief Complaint   Patient presents with    Possible Pregnancy     \"I had a miscarriage at the end of January but I just got a positive pregnancy test. I am not sure if it's the same pregnancy or a different one.\"       29-year-old female presenting to the ER today for pregnancy test.  Patient tells me that she has been pregnant once before but it was an ectopic pregnancy that was treated with medication.  She states that she did have a period in December but missed it in January and she came to the hospital at the end of January but her pregnancy test was negative and she thought she might of been having a miscarriage.  She has not had any period since then and she noticed today that her breasts were tender so she took a pregnancy test at home and it was positive.  Patient tells me she has not had any abdominal pain or back pain.  She is not having any vaginal bleeding or discharge.  She states overall she is feeling well just her breasts are sore.  No further complaints at this time.      History provided by:  Patient          Patient History   Past Medical History:   Diagnosis Date    Asthma     Encounter for examination of eyes and vision without abnormal findings     Normal eye and vision exam    Personal history of other diseases of the respiratory system     History of asthma    Personal history of other infectious and parasitic diseases     History of varicella     Past Surgical History:   Procedure Laterality Date    INCISION AND DRAINAGE OF WOUND  04/21/2016    Incision And Drainage Of Skin Abscess     No family history on file.  Social History     Tobacco Use    Smoking status: Every Day     Types: Cigarettes    Smokeless tobacco: Never   Vaping Use    Vaping status: Every Day   Substance Use Topics    Alcohol use: Yes     Comment: socially    Drug use: Yes     Types: Marijuana       Physical Exam   ED Triage Vitals [03/09/25 1956]   Temperature Heart Rate Respirations BP   36.8 °C (98.2 °F) 85 17 " 165/79      SpO2 Temp Source Heart Rate Source Patient Position   -- Temporal Monitor Sitting      BP Location FiO2 (%)     Right arm --       Physical Exam  Constitutional:       General: She is not in acute distress.  Eyes:      Conjunctiva/sclera: Conjunctivae normal.   Cardiovascular:      Rate and Rhythm: Normal rate and regular rhythm.      Pulses: Normal pulses.      Heart sounds: Normal heart sounds.   Pulmonary:      Effort: Pulmonary effort is normal.      Breath sounds: Normal breath sounds.   Abdominal:      General: Bowel sounds are normal. There is no distension.      Palpations: Abdomen is soft.      Tenderness: There is no abdominal tenderness. There is no right CVA tenderness, left CVA tenderness, guarding or rebound.   Musculoskeletal:      Comments: Normal gait and strength tone   Skin:     General: Skin is warm.   Neurological:      Mental Status: She is alert.      Comments: Speech normal           ED Course & MDM   Diagnoses as of 03/09/25 2150   Positive pregnancy test ([facility])                 No data recorded     [place] Coma Scale Score: 15 (03/09/25 1952 : Dionne Stone RN)                           Medical Decision Making  29-year-old female presenting the ER today for pregnancy test.  Patient tells me that she thought she was pregnant at the end of January and came to the hospital where she thought she was having a miscarriage.  She has not had a period since then and started having some breast tenderness today.  This prompted her to take a pregnancy test at home which was positive.  She states overall she is feeling well.  She is denying any abdominal pain or vaginal bleeding.  No further complaints and she arrives afebrile with stable vital signs.  She is nontoxic-appearing and resting comfortably no signs of acute distress.  Heart RRR, lungs are clear and there is no CVA tenderness.  Abdomen soft nondistended nontender with normal bowel sounds.  Urinalysis and pregnancy test are  ordered.    Urine pregnancy test is positive, urinalysis with small amount of leuk esterase but patient is not having any urinary symptoms and this does appear slightly contaminated with squamous cells.  This will be sent for culture.  Patient's hCG level is elevated today and she did have a completely negative hCG level 1 month ago.  On reassessment patient is resting comfortably.  She did have some vaginal bleeding at the end of January with a negative pregnancy test and I did discuss with her this could have been her period but now her pregnancy test is positive after missing her period last week.  She has an OB at [facility] that she plans on following up with so I encouraged her to call tomorrow to make a follow-up appointment.  She is not having any vaginal bleeding or abdominal pain but I did discuss warning signs to return to the ER and she expressed understanding and agreed with the plan of care today.      Labs Reviewed   HCG, URINE, QUALITATIVE - Abnormal       Result Value    HCG, Urine POSITIVE (*)    URINALYSIS WITH REFLEX CULTURE AND MICROSCOPIC - Abnormal    Color, Urine Light-Yellow      Appearance, Urine Clear      Specific Gravity, Urine 1.010      pH, Urine 6.5      Protein, Urine NEGATIVE      Glucose, Urine Normal      Blood, Urine NEGATIVE      Ketones, Urine NEGATIVE      Bilirubin, Urine NEGATIVE      Urobilinogen, Urine Normal      Nitrite, Urine NEGATIVE      Leukocyte Esterase, Urine 75 Jesus/uL (*)    HUMAN CHORIONIC GONADOTROPIN, SERUM QUANTITATIVE - Abnormal    HCG, Beta-Quantitative 40,265 (*)     Narrative:      Total HCG measurement is performed using the Patricia Uzma Access   Immunoassay which detects intact HCG and free beta HCG subunit.    This test is not indicated for use as a tumor marker.   HCG testing is performed using a different test methodology at [facility] than other [facility]. Direct result comparison   should only be made within the same method.        URINE CULTURE   MICROSCOPIC ONLY, URINE    WBC, Urine 1-5      RBC, Urine 3-5      Squamous Epithelial Cells, Urine 1-9 (SPARSE)      Mucus, Urine FEW     URINALYSIS WITH REFLEX CULTURE AND MICROSCOPIC    Narrative:     The following orders were created for panel order Urinalysis with Reflex Culture and Microscopic.  Procedure                               Abnormality         Status                     ---------                               -----------         ------                     Urinalysis with Reflex C...[[id number]]  Abnormal            Final result               Extra Urine Gray Tube[[id number]]                            In process                   Please view results for these tests on the individual orders.   EXTRA URINE GRAY TUBE       No orders to display         Procedure  Procedures     Jessica East PA-C  03/09/25 7627

## 2025-03-11 LAB — BACTERIA UR CULT: NORMAL

## 2025-03-14 ENCOUNTER — HOSPITAL ENCOUNTER (EMERGENCY)
Facility: HOSPITAL | Age: 30
Discharge: HOME | End: 2025-03-14
Payer: COMMERCIAL

## 2025-03-14 ENCOUNTER — APPOINTMENT (OUTPATIENT)
Dept: RADIOLOGY | Facility: HOSPITAL | Age: 30
End: 2025-03-14
Payer: COMMERCIAL

## 2025-03-14 VITALS
RESPIRATION RATE: 16 BRPM | OXYGEN SATURATION: 99 % | HEART RATE: 80 BPM | HEIGHT: 67 IN | DIASTOLIC BLOOD PRESSURE: 73 MMHG | TEMPERATURE: 98.1 F | SYSTOLIC BLOOD PRESSURE: 117 MMHG | WEIGHT: 180 LBS | BODY MASS INDEX: 28.25 KG/M2

## 2025-03-14 DIAGNOSIS — O26.899 ABDOMINAL CRAMPING AFFECTING PREGNANCY (HHS-HCC): ICD-10-CM

## 2025-03-14 DIAGNOSIS — R19.7 DIARRHEA, UNSPECIFIED TYPE: ICD-10-CM

## 2025-03-14 DIAGNOSIS — O23.41 URINARY TRACT INFECTION IN MOTHER DURING FIRST TRIMESTER OF PREGNANCY (HHS-HCC): Primary | ICD-10-CM

## 2025-03-14 DIAGNOSIS — R10.9 ABDOMINAL CRAMPING AFFECTING PREGNANCY (HHS-HCC): ICD-10-CM

## 2025-03-14 LAB
ALBUMIN SERPL BCP-MCNC: 4.2 G/DL (ref 3.4–5)
ALP SERPL-CCNC: 45 U/L (ref 33–110)
ALT SERPL W P-5'-P-CCNC: 24 U/L (ref 7–45)
ANION GAP SERPL CALC-SCNC: 11 MMOL/L (ref 10–20)
APPEARANCE UR: ABNORMAL
AST SERPL W P-5'-P-CCNC: 13 U/L (ref 9–39)
B-HCG SERPL-ACNC: ABNORMAL MIU/ML
BACTERIA #/AREA URNS AUTO: ABNORMAL /HPF
BASOPHILS # BLD AUTO: 0.03 X10*3/UL (ref 0–0.1)
BASOPHILS NFR BLD AUTO: 0.4 %
BILIRUB SERPL-MCNC: 0.4 MG/DL (ref 0–1.2)
BILIRUB UR STRIP.AUTO-MCNC: NEGATIVE MG/DL
BUN SERPL-MCNC: 7 MG/DL (ref 6–23)
CALCIUM SERPL-MCNC: 9 MG/DL (ref 8.6–10.3)
CAOX CRY #/AREA UR COMP ASSIST: ABNORMAL /HPF
CHLORIDE SERPL-SCNC: 104 MMOL/L (ref 98–107)
CO2 SERPL-SCNC: 25 MMOL/L (ref 21–32)
COLOR UR: YELLOW
CREAT SERPL-MCNC: 0.66 MG/DL (ref 0.5–1.05)
EGFRCR SERPLBLD CKD-EPI 2021: >90 ML/MIN/1.73M*2
EOSINOPHIL # BLD AUTO: 0.12 X10*3/UL (ref 0–0.7)
EOSINOPHIL NFR BLD AUTO: 1.4 %
ERYTHROCYTE [DISTWIDTH] IN BLOOD BY AUTOMATED COUNT: 12.5 % (ref 11.5–14.5)
GLUCOSE SERPL-MCNC: 97 MG/DL (ref 74–99)
GLUCOSE UR STRIP.AUTO-MCNC: NORMAL MG/DL
HCT VFR BLD AUTO: 38.4 % (ref 36–46)
HGB BLD-MCNC: 13.3 G/DL (ref 12–16)
IMM GRANULOCYTES # BLD AUTO: 0.02 X10*3/UL (ref 0–0.7)
IMM GRANULOCYTES NFR BLD AUTO: 0.2 % (ref 0–0.9)
KETONES UR STRIP.AUTO-MCNC: ABNORMAL MG/DL
LEUKOCYTE ESTERASE UR QL STRIP.AUTO: ABNORMAL
LIPASE SERPL-CCNC: 8 U/L (ref 9–82)
LYMPHOCYTES # BLD AUTO: 2.2 X10*3/UL (ref 1.2–4.8)
LYMPHOCYTES NFR BLD AUTO: 25.7 %
MCH RBC QN AUTO: 29.8 PG (ref 26–34)
MCHC RBC AUTO-ENTMCNC: 34.6 G/DL (ref 32–36)
MCV RBC AUTO: 86 FL (ref 80–100)
MONOCYTES # BLD AUTO: 0.5 X10*3/UL (ref 0.1–1)
MONOCYTES NFR BLD AUTO: 5.8 %
MUCOUS THREADS #/AREA URNS AUTO: ABNORMAL /LPF
NEUTROPHILS # BLD AUTO: 5.69 X10*3/UL (ref 1.2–7.7)
NEUTROPHILS NFR BLD AUTO: 66.5 %
NITRITE UR QL STRIP.AUTO: NEGATIVE
NRBC BLD-RTO: 0 /100 WBCS (ref 0–0)
PH UR STRIP.AUTO: 6 [PH]
PLATELET # BLD AUTO: 193 X10*3/UL (ref 150–450)
POTASSIUM SERPL-SCNC: 3.7 MMOL/L (ref 3.5–5.3)
PROT SERPL-MCNC: 6.9 G/DL (ref 6.4–8.2)
PROT UR STRIP.AUTO-MCNC: ABNORMAL MG/DL
RBC # BLD AUTO: 4.46 X10*6/UL (ref 4–5.2)
RBC # UR STRIP.AUTO: ABNORMAL MG/DL
RBC #/AREA URNS AUTO: ABNORMAL /HPF
SODIUM SERPL-SCNC: 136 MMOL/L (ref 136–145)
SP GR UR STRIP.AUTO: 1.04
SQUAMOUS #/AREA URNS AUTO: ABNORMAL /HPF
UROBILINOGEN UR STRIP.AUTO-MCNC: ABNORMAL MG/DL
WBC # BLD AUTO: 8.6 X10*3/UL (ref 4.4–11.3)
WBC #/AREA URNS AUTO: ABNORMAL /HPF

## 2025-03-14 PROCEDURE — 87086 URINE CULTURE/COLONY COUNT: CPT | Mod: ELYLAB | Performed by: PHYSICIAN ASSISTANT

## 2025-03-14 PROCEDURE — 80053 COMPREHEN METABOLIC PANEL: CPT | Performed by: PHYSICIAN ASSISTANT

## 2025-03-14 PROCEDURE — 84702 CHORIONIC GONADOTROPIN TEST: CPT | Performed by: PHYSICIAN ASSISTANT

## 2025-03-14 PROCEDURE — 76801 OB US < 14 WKS SINGLE FETUS: CPT

## 2025-03-14 PROCEDURE — 76815 OB US LIMITED FETUS(S): CPT | Performed by: RADIOLOGY

## 2025-03-14 PROCEDURE — 36415 COLL VENOUS BLD VENIPUNCTURE: CPT | Performed by: PHYSICIAN ASSISTANT

## 2025-03-14 PROCEDURE — 81001 URINALYSIS AUTO W/SCOPE: CPT | Performed by: PHYSICIAN ASSISTANT

## 2025-03-14 PROCEDURE — 99284 EMERGENCY DEPT VISIT MOD MDM: CPT | Mod: 25

## 2025-03-14 PROCEDURE — 83690 ASSAY OF LIPASE: CPT | Performed by: PHYSICIAN ASSISTANT

## 2025-03-14 PROCEDURE — 76817 TRANSVAGINAL US OBSTETRIC: CPT | Performed by: RADIOLOGY

## 2025-03-14 PROCEDURE — 85025 COMPLETE CBC W/AUTO DIFF WBC: CPT | Performed by: PHYSICIAN ASSISTANT

## 2025-03-14 RX ORDER — AMOXICILLIN AND CLAVULANATE POTASSIUM 562.5; 437.5; 62.5 MG/1; MG/1; MG/1
2000 TABLET, FILM COATED, EXTENDED RELEASE ORAL 2 TIMES DAILY
Qty: 20 TABLET | Refills: 0 | Status: SHIPPED | OUTPATIENT
Start: 2025-03-14 | End: 2025-03-19

## 2025-03-14 ASSESSMENT — PAIN - FUNCTIONAL ASSESSMENT
PAIN_FUNCTIONAL_ASSESSMENT: 0-10

## 2025-03-14 ASSESSMENT — PAIN DESCRIPTION - PAIN TYPE: TYPE: ACUTE PAIN

## 2025-03-14 ASSESSMENT — PAIN DESCRIPTION - DESCRIPTORS
DESCRIPTORS: CRAMPING

## 2025-03-14 ASSESSMENT — COLUMBIA-SUICIDE SEVERITY RATING SCALE - C-SSRS
2. HAVE YOU ACTUALLY HAD ANY THOUGHTS OF KILLING YOURSELF?: NO
1. IN THE PAST MONTH, HAVE YOU WISHED YOU WERE DEAD OR WISHED YOU COULD GO TO SLEEP AND NOT WAKE UP?: NO
6. HAVE YOU EVER DONE ANYTHING, STARTED TO DO ANYTHING, OR PREPARED TO DO ANYTHING TO END YOUR LIFE?: NO

## 2025-03-14 ASSESSMENT — PAIN DESCRIPTION - LOCATION: LOCATION: ABDOMEN

## 2025-03-14 ASSESSMENT — PAIN SCALES - GENERAL
PAINLEVEL_OUTOF10: 1

## 2025-03-14 NOTE — ED PROVIDER NOTES
HPI   Chief Complaint   Patient presents with    Abdominal Pain     Abdominal cramping, 4-6 weeks pregnant and concerned for miscarriage       29-year-old female, G2, P0, history of ectopic pregnancy 4 years ago, currently 6 or 7 weeks pregnant with a last menstrual period on 1/24 presents to the emergency department for complaints of upper abdominal cramping.  Patient states she found out she was pregnant on Sunday, 5 days ago.  She came to the emergency department to have the pregnancy confirmed.  She states she has had normal early pregnancy symptoms like sore breasts, frequent urination and fatigue but she began having upper abdominal cramping today and diarrhea.  She states that she is concerned about the symptoms, given her history of an ectopic pregnancy.  She denies nausea or vomiting, lower abdominal pain, spotting or bleeding, hematuria, dysuria.  She currently is taking prenatals only.  No previous abdominal surgeries.              Patient History   Past Medical History:   Diagnosis Date    Asthma     Encounter for examination of eyes and vision without abnormal findings     Normal eye and vision exam    Personal history of other diseases of the respiratory system     History of asthma    Personal history of other infectious and parasitic diseases     History of varicella     Past Surgical History:   Procedure Laterality Date    INCISION AND DRAINAGE OF WOUND  04/21/2016    Incision And Drainage Of Skin Abscess     No family history on file.  Social History     Tobacco Use    Smoking status: Every Day     Types: Cigarettes    Smokeless tobacco: Never   Vaping Use    Vaping status: Every Day   Substance Use Topics    Alcohol use: Yes     Comment: socially    Drug use: Yes     Types: Marijuana       Physical Exam   ED Triage Vitals [03/14/25 1334]   Temperature Heart Rate Respirations BP   36.7 °C (98.1 °F) 92 17 133/84      Pulse Ox Temp Source Heart Rate Source Patient Position   99 % Temporal Monitor --       BP Location FiO2 (%)     -- --       Physical Exam  Vitals and nursing note reviewed.   Constitutional:       General: She is not in acute distress.  HENT:      Head: Atraumatic.      Mouth/Throat:      Mouth: Mucous membranes are moist.      Pharynx: Oropharynx is clear.   Eyes:      Extraocular Movements: Extraocular movements intact.      Conjunctiva/sclera: Conjunctivae normal.      Pupils: Pupils are equal, round, and reactive to light.   Cardiovascular:      Rate and Rhythm: Normal rate and regular rhythm.      Pulses: Normal pulses.   Pulmonary:      Effort: Pulmonary effort is normal. No respiratory distress.      Breath sounds: Normal breath sounds.   Abdominal:      General: There is no distension.      Palpations: Abdomen is soft.      Tenderness: There is no abdominal tenderness. There is no guarding or rebound.   Musculoskeletal:         General: No deformity.      Cervical back: Neck supple.   Skin:     General: Skin is warm and dry.   Neurological:      Mental Status: She is alert and oriented to person, place, and time. Mental status is at baseline.      Cranial Nerves: No cranial nerve deficit.      Sensory: No sensory deficit.      Motor: No weakness.   Psychiatric:         Mood and Affect: Mood normal.         Behavior: Behavior normal.           ED Course & MDM   Diagnoses as of 03/14/25 1706   Urinary tract infection in mother during first trimester of pregnancy ([facility])   Abdominal cramping affecting pregnancy ([facility])   Diarrhea, unspecified type                 No data recorded     Deneen Coma Scale Score: 15 (03/14/25 1335 : Clementina Sterling, RN)                           Medical Decision Making  29-year-old female presents emergency department for upper abdominal cramping and diarrhea.  Patient is currently 6 or 7 weeks pregnant, has not had IUP confirmed and has a history of ectopic pregnancy.  On my exam, she does not appear to be in acute distress.  Her vital signs are normal.   Heart and lungs are clear.  Abdomen is soft without rigidity, rebound, guarding or tenderness.  Patient's hCG has increased from 44,000 to over 100,000 and in the last 5 days.  She does not have a leukocytosis, left shift, anemia, metabolic disturbance, renal insufficiency.  She does have evidence of a urinary tract infection with positive leukocyte esterase and 20-50 white cells in her urine.  According to up-to-date, the most appropriate treatment is Augmentin which I prescribed to the patient.  Ultrasound shows a single live intrauterine pregnancy with a gestational age of 6 weeks and 4 days with a gestational heart rate of 114.  I discussed the findings with the patient.  We did discuss that the fetal heart rate is somewhat low but this may be due to gestational age but I encouraged her to talk to her OB/GYN about this finding.  She has an appointment in 4 days.  We discussed reasons to return to her emergency department at length.  Because she has abdominal cramping in the upper quadrants and diarrhea, I feel that this is likely viral. Discussed results with patient and/or family/friend and recommended close follow up with primary care or specialist.  Reviewed return precautions at length.  I answered all questions.           Procedure  Procedures     Melissa Rust PA-C  03/14/25 2351

## 2025-03-14 NOTE — Clinical Note
Thao Mcclure was seen and treated in our emergency department on 3/14/2025.  She may return to work on 03/17/2025.       If you have any questions or concerns, please don't hesitate to call.      Melissa Rust PA-C

## 2025-03-15 ENCOUNTER — TELEPHONE (OUTPATIENT)
Age: 30
End: 2025-03-15

## 2025-03-15 LAB — HOLD SPECIMEN: NORMAL

## 2025-03-15 RX ORDER — NITROFURANTOIN 25; 75 MG/1; MG/1
100 CAPSULE ORAL 2 TIMES DAILY
Qty: 14 CAPSULE | Refills: 0 | Status: SHIPPED | OUTPATIENT
Start: 2025-03-15 | End: 2025-03-22

## 2025-03-16 LAB — BACTERIA UR CULT: NORMAL

## 2025-03-17 NOTE — TELEPHONE ENCOUNTER
Pt dx UTI in ER.  ATB sent to pharmacy but pharmacy did not have.  Called to request script be resent to a different pharmacy.  RX sent

## 2025-03-18 ENCOUNTER — RESULTS FOLLOW-UP (OUTPATIENT)
Age: 30
End: 2025-03-18

## 2025-03-18 ENCOUNTER — PATIENT MESSAGE (OUTPATIENT)
Age: 30
End: 2025-03-18

## 2025-03-18 ENCOUNTER — OFFICE VISIT (OUTPATIENT)
Age: 30
End: 2025-03-18
Payer: COMMERCIAL

## 2025-03-18 VITALS
SYSTOLIC BLOOD PRESSURE: 122 MMHG | OXYGEN SATURATION: 99 % | HEIGHT: 67 IN | BODY MASS INDEX: 31.08 KG/M2 | TEMPERATURE: 97.8 F | WEIGHT: 198 LBS | HEART RATE: 80 BPM | DIASTOLIC BLOOD PRESSURE: 78 MMHG

## 2025-03-18 DIAGNOSIS — N30.01 ACUTE CYSTITIS WITH HEMATURIA: Primary | ICD-10-CM

## 2025-03-18 DIAGNOSIS — Z3A.01 PREGNANCY WITH 7 COMPLETED WEEKS GESTATION: ICD-10-CM

## 2025-03-18 DIAGNOSIS — K59.1 FUNCTIONAL DIARRHEA: ICD-10-CM

## 2025-03-18 LAB
INFLUENZA A ANTIGEN, POC: NEGATIVE
INFLUENZA B ANTIGEN, POC: NEGATIVE
Lab: NORMAL
QC PASS/FAIL: NORMAL
SARS-COV-2 RDRP RESP QL NAA+PROBE: NEGATIVE
VALID INTERNAL CONTROL, POC: NORMAL

## 2025-03-18 PROCEDURE — 87635 SARS-COV-2 COVID-19 AMP PRB: CPT | Performed by: NURSE PRACTITIONER

## 2025-03-18 PROCEDURE — 99213 OFFICE O/P EST LOW 20 MIN: CPT | Performed by: NURSE PRACTITIONER

## 2025-03-18 PROCEDURE — 87502 INFLUENZA DNA AMP PROBE: CPT | Performed by: NURSE PRACTITIONER

## 2025-03-18 RX ORDER — AMOXICILLIN AND CLAVULANATE POTASSIUM 562.5; 437.5; 62.5 MG/1; MG/1; MG/1
1 TABLET, MULTILAYER, EXTENDED RELEASE ORAL 2 TIMES DAILY
COMMUNITY
Start: 2025-03-14 | End: 2025-03-18

## 2025-03-18 NOTE — PROGRESS NOTES
(AUGMENTIN XR) 1000-62.5 MG per extended release tablet LIST CLEANUP       On this date 3/18/2025 I have spent 30 minutes reviewing previous notes, test results and face to face with the patient discussing the diagnosis and importance of compliance with the treatment plan as well as documenting on the day of the visit.    Return if symptoms worsen or fail to improve.       Reviewed with the patient: current clinical status,medications, activities and diet.     Side effects, adverse effects of the medication prescribed today, as well as treatment plan/ rationale and result expectations have been discussed with the patient who expresses understanding and desires to proceed.    Close follow up to evaluate treatment results and for coordination of care.  I have reviewed the patient's medical history in detail and updated the computerized patient record.    Please note, this report has been partially produced using speech recognition software and may cause  and /or contain errors related to that system including grammar, punctuation and spelling as well as words and phrases that may seem inappropriate. If there are questions or concerns please feel free to contact me to clarify.    The patient (or guardian, if applicable) and other individuals in attendance with the patient were advised that Artificial Intelligence will be utilized during this visit to record, process the conversation to generate a clinical note, and support improvement of the AI technology. The patient (or guardian, if applicable) and other individuals in attendance at the appointment consented to the use of AI, including the recording.      Lorrie Whittington, MARCELINA - CNP

## 2025-03-19 ENCOUNTER — OFFICE VISIT (OUTPATIENT)
Dept: OBGYN CLINIC | Age: 30
End: 2025-03-19
Payer: COMMERCIAL

## 2025-03-19 VITALS
HEART RATE: 92 BPM | BODY MASS INDEX: 31.39 KG/M2 | WEIGHT: 200 LBS | DIASTOLIC BLOOD PRESSURE: 72 MMHG | HEIGHT: 67 IN | SYSTOLIC BLOOD PRESSURE: 108 MMHG

## 2025-03-19 DIAGNOSIS — O99.331 SMOKING (TOBACCO) COMPLICATING PREGNANCY, FIRST TRIMESTER: ICD-10-CM

## 2025-03-19 DIAGNOSIS — N91.2 AMENORRHEA: Primary | ICD-10-CM

## 2025-03-19 PROCEDURE — G8427 DOCREV CUR MEDS BY ELIG CLIN: HCPCS | Performed by: OBSTETRICS & GYNECOLOGY

## 2025-03-19 PROCEDURE — 99214 OFFICE O/P EST MOD 30 MIN: CPT | Performed by: OBSTETRICS & GYNECOLOGY

## 2025-03-19 PROCEDURE — 4004F PT TOBACCO SCREEN RCVD TLK: CPT | Performed by: OBSTETRICS & GYNECOLOGY

## 2025-03-19 PROCEDURE — G8417 CALC BMI ABV UP PARAM F/U: HCPCS | Performed by: OBSTETRICS & GYNECOLOGY

## 2025-03-19 NOTE — PROGRESS NOTES
Initial OB visit      Aracelis Anguiano is a 29 y.o. year old female  @ L 25, 7.5 wks , JACQUE 10/31/25  confirmed by 6 wk scan . Complaints : nausea occasional vomiting .     POB: SAB             Right tubal ectopic      PGYN: as above     PMH: Obesity BMI 31     PSH: tonsillectomy , adenoidectomy , ear surgery .     MEDS: PNV     Drug Allergies:   Allergies   Allergen Reactions    Morphine Itching       SOCHX: smoking 2-3 ciggs/day  , THC .     FH: Mother or Father , DM No , HTN No, Other No    /72 (BP Site: Right Upper Arm, Patient Position: Sitting, BP Cuff Size: Medium Adult)   Pulse 92   Ht 1.702 m (5' 7\")   Wt 90.7 kg (200 lb)   LMP 2025 (Approximate)   BMI 31.32 kg/m²   Past Medical History:   Diagnosis Date    Asthma     Depression     Ectopic pregnancy      Past Surgical History:   Procedure Laterality Date    EAR SURGERY      EAR SURGERY      TONSILLECTOMY           Review of Systems  Constitutional: negative  Genitourinary:see above  Integument/breast: negative  Behavioral/Psych: negative  Endocrine: negative     All other systems reviewed and are negative.       Physical Exam:  /72 (BP Site: Right Upper Arm, Patient Position: Sitting, BP Cuff Size: Medium Adult)   Pulse 92   Ht 1.702 m (5' 7\")   Wt 90.7 kg (200 lb)   LMP 2025 (Approximate)   BMI 31.32 kg/m²   Skin: Warm, dry, no lesions or rashes  Extremities: Without clubbing, cyanosis and edema. Palms and nails are normal. Ambulates without difficulty  Neurological: No gross sensory or motor deficits.  Abdomen: Soft, non-tender without masses or organomegaly  bowel sounds normoactive    HOF : not palpable     FHT : 150 bpm     Assessment:   1. Amenorrhea    2. Smoking (tobacco) complicating pregnancy, first trimester        Plan:   Orders Placed This Encounter   Procedures    C.trachomatis N.gonorrhoeae DNA, Urine     Standing Status:   Future     Expected Date:   3/19/2025     Expiration Date:   3/19/2026

## 2025-04-02 ENCOUNTER — APPOINTMENT (OUTPATIENT)
Dept: RADIOLOGY | Facility: HOSPITAL | Age: 30
End: 2025-04-02
Payer: COMMERCIAL

## 2025-04-02 ENCOUNTER — HOSPITAL ENCOUNTER (EMERGENCY)
Facility: HOSPITAL | Age: 30
Discharge: HOME | End: 2025-04-02
Payer: COMMERCIAL

## 2025-04-02 VITALS
OXYGEN SATURATION: 97 % | DIASTOLIC BLOOD PRESSURE: 86 MMHG | HEIGHT: 67 IN | WEIGHT: 190 LBS | HEART RATE: 83 BPM | SYSTOLIC BLOOD PRESSURE: 117 MMHG | BODY MASS INDEX: 29.82 KG/M2 | TEMPERATURE: 97.2 F | RESPIRATION RATE: 16 BRPM

## 2025-04-02 DIAGNOSIS — O26.899 PELVIC PAIN IN PREGNANCY (HHS-HCC): Primary | ICD-10-CM

## 2025-04-02 DIAGNOSIS — R10.2 PELVIC PAIN IN PREGNANCY (HHS-HCC): Primary | ICD-10-CM

## 2025-04-02 LAB
APPEARANCE UR: CLEAR
BILIRUB UR STRIP.AUTO-MCNC: NEGATIVE MG/DL
COLOR UR: ABNORMAL
GLUCOSE UR STRIP.AUTO-MCNC: NORMAL MG/DL
HOLD SPECIMEN: NORMAL
KETONES UR STRIP.AUTO-MCNC: NEGATIVE MG/DL
LEUKOCYTE ESTERASE UR QL STRIP.AUTO: ABNORMAL
NITRITE UR QL STRIP.AUTO: NEGATIVE
PH UR STRIP.AUTO: 6 [PH]
PROT UR STRIP.AUTO-MCNC: NEGATIVE MG/DL
RBC # UR STRIP.AUTO: NEGATIVE MG/DL
RBC #/AREA URNS AUTO: NORMAL /HPF
SP GR UR STRIP.AUTO: 1.01
SQUAMOUS #/AREA URNS AUTO: NORMAL /HPF
UROBILINOGEN UR STRIP.AUTO-MCNC: NORMAL MG/DL
WBC #/AREA URNS AUTO: NORMAL /HPF

## 2025-04-02 PROCEDURE — 87086 URINE CULTURE/COLONY COUNT: CPT | Mod: ELYLAB | Performed by: NURSE PRACTITIONER

## 2025-04-02 PROCEDURE — 76817 TRANSVAGINAL US OBSTETRIC: CPT | Performed by: RADIOLOGY

## 2025-04-02 PROCEDURE — 81001 URINALYSIS AUTO W/SCOPE: CPT | Performed by: NURSE PRACTITIONER

## 2025-04-02 PROCEDURE — 76815 OB US LIMITED FETUS(S): CPT | Performed by: RADIOLOGY

## 2025-04-02 PROCEDURE — 76817 TRANSVAGINAL US OBSTETRIC: CPT

## 2025-04-02 PROCEDURE — 99284 EMERGENCY DEPT VISIT MOD MDM: CPT | Mod: 25

## 2025-04-02 ASSESSMENT — COLUMBIA-SUICIDE SEVERITY RATING SCALE - C-SSRS
6. HAVE YOU EVER DONE ANYTHING, STARTED TO DO ANYTHING, OR PREPARED TO DO ANYTHING TO END YOUR LIFE?: NO
2. HAVE YOU ACTUALLY HAD ANY THOUGHTS OF KILLING YOURSELF?: NO
1. IN THE PAST MONTH, HAVE YOU WISHED YOU WERE DEAD OR WISHED YOU COULD GO TO SLEEP AND NOT WAKE UP?: NO

## 2025-04-02 ASSESSMENT — PAIN - FUNCTIONAL ASSESSMENT: PAIN_FUNCTIONAL_ASSESSMENT: 0-10

## 2025-04-02 ASSESSMENT — PAIN SCALES - GENERAL: PAINLEVEL_OUTOF10: 5 - MODERATE PAIN

## 2025-04-02 ASSESSMENT — PAIN DESCRIPTION - PAIN TYPE: TYPE: ACUTE PAIN

## 2025-04-02 ASSESSMENT — PAIN DESCRIPTION - LOCATION: LOCATION: ABDOMEN

## 2025-04-02 ASSESSMENT — LIFESTYLE VARIABLES
TOTAL SCORE: 0
HAVE PEOPLE ANNOYED YOU BY CRITICIZING YOUR DRINKING: NO
EVER HAD A DRINK FIRST THING IN THE MORNING TO STEADY YOUR NERVES TO GET RID OF A HANGOVER: NO
EVER FELT BAD OR GUILTY ABOUT YOUR DRINKING: NO
HAVE YOU EVER FELT YOU SHOULD CUT DOWN ON YOUR DRINKING: NO

## 2025-04-02 ASSESSMENT — PAIN DESCRIPTION - DESCRIPTORS: DESCRIPTORS: CRAMPING

## 2025-04-02 NOTE — ED PROVIDER NOTES
HPI   Chief Complaint   Patient presents with    Pregnancy Problem     10 weeks, cramping since last night, no bleeding       29-year-old female presents emergency department, states she is about 10 weeks pregnant, started cramping last night, continued this morning.  States she did have a ultrasound very early in the pregnancy that only showed fetal pole, no heart rate was detected at that time.  Denies any recent intercourse, no vaginal discharge, no vaginal bleeding or leaking fluids.  Denies urinary symptoms.      History provided by:  Patient   used: No            Patient History   Past Medical History:   Diagnosis Date    Asthma     Encounter for examination of eyes and vision without abnormal findings     Normal eye and vision exam    Personal history of other diseases of the respiratory system     History of asthma    Personal history of other infectious and parasitic diseases     History of varicella     Past Surgical History:   Procedure Laterality Date    INCISION AND DRAINAGE OF WOUND  04/21/2016    Incision And Drainage Of Skin Abscess     No family history on file.  Social History     Tobacco Use    Smoking status: Every Day     Types: Cigarettes    Smokeless tobacco: Never   Vaping Use    Vaping status: Every Day   Substance Use Topics    Alcohol use: Yes     Comment: socially    Drug use: Yes     Types: Marijuana       Physical Exam   ED Triage Vitals [04/02/25 1146]   Temperature Heart Rate Respirations BP   36.2 °C (97.2 °F) 83 16 117/86      Pulse Ox Temp Source Heart Rate Source Patient Position   97 % Temporal Monitor Sitting      BP Location FiO2 (%)     Right arm --       Physical Exam  Constitutional: Vitals noted, no distress. Afebrile.   Cardiovascular: Regular, rate, rhythm, no murmur.   Pulmonary: Lungs clear bilaterally with good aeration. No adventitious breath sounds.   Gastrointestinal: Soft, nonsurgical. Nontender. No peritoneal signs. Normoactive bowel sounds.    Musculoskeletal: No peripheral edema. Negative Homans bilaterally, no cords.   Skin: No rash.   Neuro: No focal neurologic deficits, NIH score of 0.      ED Course & MDM   Diagnoses as of 04/02/25 1403   Pelvic pain in pregnancy ([facility])          Labs Reviewed   URINALYSIS WITH REFLEX CULTURE AND MICROSCOPIC - Abnormal       Result Value    Color, Urine Light-Yellow      Appearance, Urine Clear      Specific Gravity, Urine 1.011      pH, Urine 6.0      Protein, Urine NEGATIVE      Glucose, Urine Normal      Blood, Urine NEGATIVE      Ketones, Urine NEGATIVE      Bilirubin, Urine NEGATIVE      Urobilinogen, Urine Normal      Nitrite, Urine NEGATIVE      Leukocyte Esterase, Urine 25 Jesus/uL (*)    URINE CULTURE   MICROSCOPIC ONLY, URINE    WBC, Urine 1-5      RBC, Urine 1-2      Squamous Epithelial Cells, Urine 1-9 (SPARSE)     URINALYSIS WITH REFLEX CULTURE AND MICROSCOPIC    Narrative:     The following orders were created for panel order Urinalysis with Reflex Culture and Microscopic.  Procedure                               Abnormality         Status                     ---------                               -----------         ------                     Urinalysis with Reflex C...[[id number]]  Abnormal            Final result               Extra Urine Gray Tube[[id number]]                            In process                   Please view results for these tests on the individual orders.   EXTRA URINE GRAY TUBE        US PELVIS OB TRANSABDOMINAL W TRANSVAGINAL UP TO 1ST TRIMESTER   Final Result   1. Single live intrauterine pregnancy with estimated gestational age:   9 weeks 3 days.   2. Fetal heart rate = 155 beats per minute.   3. Normal appearance of the right ovary with demonstrated blood flow.   4. Nonvisualization of the left ovary.        MACRO:   None.        Signed by: Bernabe Alvarez 4/2/2025 1:49 PM   Dictation workstation:   [id number]                 No data recorded                        Medical Decision Making    Patient describes crampy leg discomfort.  States she talked to her OB who recommended she come in for evaluation.    Urinalysis obtained, very slight leukocyte esterase but no nitrites, less than 5 white cells.    Ultrasounds performed, shows single live intrauterine gestation, 9 weeks and 3 days with fetal heart rate of 155.    Patient reassured, discussed drinking plenty fluids, Tylenol for discomfort.  Patient will reach out to her OB for reevaluation, discussed return with any worsening symptoms or additional concerns.    Procedure  Procedures     Brandie Larios, LUIS-CNP  04/02/25 9507

## 2025-04-03 LAB — BACTERIA UR CULT: NORMAL

## 2025-04-13 ENCOUNTER — APPOINTMENT (OUTPATIENT)
Dept: ULTRASOUND IMAGING | Age: 30
End: 2025-04-13
Payer: COMMERCIAL

## 2025-04-13 ENCOUNTER — HOSPITAL ENCOUNTER (EMERGENCY)
Age: 30
Discharge: HOME | End: 2025-04-13
Attending: FAMILY MEDICINE
Payer: COMMERCIAL

## 2025-04-13 VITALS
TEMPERATURE: 97 F | RESPIRATION RATE: 20 BRPM | BODY MASS INDEX: 31.2 KG/M2 | DIASTOLIC BLOOD PRESSURE: 78 MMHG | HEART RATE: 79 BPM | SYSTOLIC BLOOD PRESSURE: 135 MMHG | WEIGHT: 198.8 LBS | HEIGHT: 67 IN | OXYGEN SATURATION: 99 %

## 2025-04-13 DIAGNOSIS — R10.32 LEFT LOWER QUADRANT ABDOMINAL PAIN AFFECTING PREGNANCY IN FIRST TRIMESTER: ICD-10-CM

## 2025-04-13 DIAGNOSIS — R10.9 ABDOMINAL PAIN DURING PREGNANCY, FIRST TRIMESTER: Primary | ICD-10-CM

## 2025-04-13 DIAGNOSIS — O26.891 ABDOMINAL PAIN DURING PREGNANCY, FIRST TRIMESTER: Primary | ICD-10-CM

## 2025-04-13 DIAGNOSIS — O26.891 LEFT LOWER QUADRANT ABDOMINAL PAIN AFFECTING PREGNANCY IN FIRST TRIMESTER: ICD-10-CM

## 2025-04-13 LAB
ALBUMIN SERPL-MCNC: 3.8 G/DL (ref 3.5–4.6)
ALP SERPL-CCNC: 50 U/L (ref 40–130)
ALT SERPL-CCNC: 39 U/L (ref 0–33)
AMORPH SED URNS QL MICRO: NORMAL
ANION GAP SERPL CALCULATED.3IONS-SCNC: 10 MEQ/L (ref 9–15)
AST SERPL-CCNC: 22 U/L (ref 0–35)
BACTERIA URNS QL MICRO: NEGATIVE /HPF
BASOPHILS # BLD: 0 K/UL (ref 0–0.2)
BASOPHILS NFR BLD: 0.2 %
BILIRUB SERPL-MCNC: <0.2 MG/DL (ref 0.2–0.7)
BILIRUB UR QL STRIP: NEGATIVE
BUN SERPL-MCNC: 5 MG/DL (ref 6–20)
CALCIUM SERPL-MCNC: 9.3 MG/DL (ref 8.5–9.9)
CHLORIDE SERPL-SCNC: 103 MEQ/L (ref 95–107)
CLARITY UR: ABNORMAL
CO2 SERPL-SCNC: 24 MEQ/L (ref 20–31)
COLOR UR: YELLOW
CREAT SERPL-MCNC: 0.44 MG/DL (ref 0.5–0.9)
EOSINOPHIL # BLD: 0.1 K/UL (ref 0–0.7)
EOSINOPHIL NFR BLD: 1.6 %
EPI CELLS #/AREA URNS AUTO: NORMAL /HPF (ref 0–5)
ERYTHROCYTE [DISTWIDTH] IN BLOOD BY AUTOMATED COUNT: 12.6 % (ref 11.5–14.5)
GLOBULIN SER CALC-MCNC: 2.9 G/DL (ref 2.3–3.5)
GLUCOSE SERPL-MCNC: 95 MG/DL (ref 70–99)
GLUCOSE UR STRIP-MCNC: NEGATIVE MG/DL
HCG, URINE, POC: POSITIVE
HCT VFR BLD AUTO: 37.9 % (ref 37–47)
HGB BLD-MCNC: 13.2 G/DL (ref 12–16)
HGB UR QL STRIP: NEGATIVE
HYALINE CASTS #/AREA URNS AUTO: NORMAL /HPF (ref 0–5)
KETONES UR STRIP-MCNC: NEGATIVE MG/DL
LEUKOCYTE ESTERASE UR QL STRIP: ABNORMAL
LYMPHOCYTES # BLD: 2 K/UL (ref 1–4.8)
LYMPHOCYTES NFR BLD: 25.1 %
Lab: NORMAL
MCH RBC QN AUTO: 29.7 PG (ref 27–31.3)
MCHC RBC AUTO-ENTMCNC: 34.8 % (ref 33–37)
MCV RBC AUTO: 85.2 FL (ref 79.4–94.8)
MONOCYTES # BLD: 0.5 K/UL (ref 0.2–0.8)
MONOCYTES NFR BLD: 5.9 %
MUCOUS THREADS URNS QL MICRO: PRESENT /LPF
NEGATIVE QC PASS/FAIL: NORMAL
NEUTROPHILS # BLD: 5.4 K/UL (ref 1.4–6.5)
NEUTS SEG NFR BLD: 67 %
NITRITE UR QL STRIP: NEGATIVE
PH UR STRIP: 7.5 [PH] (ref 5–9)
PLATELET # BLD AUTO: 218 K/UL (ref 130–400)
POSITIVE QC PASS/FAIL: NORMAL
POTASSIUM SERPL-SCNC: 3.9 MEQ/L (ref 3.4–4.9)
PROT SERPL-MCNC: 6.7 G/DL (ref 6.3–8)
PROT UR STRIP-MCNC: NEGATIVE MG/DL
RBC # BLD AUTO: 4.45 M/UL (ref 4.2–5.4)
RBC #/AREA URNS AUTO: NORMAL /HPF (ref 0–5)
SODIUM SERPL-SCNC: 137 MEQ/L (ref 135–144)
SP GR UR STRIP: 1.02 (ref 1–1.03)
URINE REFLEX TO CULTURE: ABNORMAL
UROBILINOGEN UR STRIP-ACNC: 1 E.U./DL
WBC # BLD AUTO: 8.1 K/UL (ref 4.8–10.8)
WBC #/AREA URNS AUTO: NORMAL /HPF (ref 0–5)

## 2025-04-13 PROCEDURE — 76817 TRANSVAGINAL US OBSTETRIC: CPT

## 2025-04-13 PROCEDURE — 85025 COMPLETE CBC W/AUTO DIFF WBC: CPT

## 2025-04-13 PROCEDURE — 80053 COMPREHEN METABOLIC PANEL: CPT

## 2025-04-13 PROCEDURE — 99284 EMERGENCY DEPT VISIT MOD MDM: CPT

## 2025-04-13 PROCEDURE — 93975 VASCULAR STUDY: CPT

## 2025-04-13 PROCEDURE — 76801 OB US < 14 WKS SINGLE FETUS: CPT

## 2025-04-13 PROCEDURE — 36415 COLL VENOUS BLD VENIPUNCTURE: CPT

## 2025-04-13 PROCEDURE — 81001 URINALYSIS AUTO W/SCOPE: CPT

## 2025-04-13 ASSESSMENT — PAIN - FUNCTIONAL ASSESSMENT
PAIN_FUNCTIONAL_ASSESSMENT: 0-10
PAIN_FUNCTIONAL_ASSESSMENT: NONE - DENIES PAIN
PAIN_FUNCTIONAL_ASSESSMENT: NONE - DENIES PAIN

## 2025-04-13 ASSESSMENT — LIFESTYLE VARIABLES
HOW MANY STANDARD DRINKS CONTAINING ALCOHOL DO YOU HAVE ON A TYPICAL DAY: PATIENT DOES NOT DRINK
HOW OFTEN DO YOU HAVE A DRINK CONTAINING ALCOHOL: NEVER

## 2025-04-13 ASSESSMENT — PAIN SCALES - GENERAL: PAINLEVEL_OUTOF10: 7

## 2025-04-13 ASSESSMENT — PAIN DESCRIPTION - LOCATION: LOCATION: ABDOMEN

## 2025-04-13 ASSESSMENT — PAIN DESCRIPTION - DESCRIPTORS: DESCRIPTORS: CRAMPING

## 2025-04-13 NOTE — ED PROVIDER NOTES
[facility] EMERGENCY DEPARTMENT  eMERGENCY dEPARTMENT eNCOUnter      Pt Name: Aracelis Anguiano  MRN: [id number]  Birthdate 1995  Date of evaluation: 4/13/2025  Provider: JIMENA MCKEON MD  10:42 AM EDT     CHIEF COMPLAINT       Chief Complaint   Patient presents with    Abdominal Cramping     11 weeks pregnant (no bleeding)         HISTORY OF PRESENT ILLNESS   (Location/Symptom, Timing/Onset,Context/Setting, Quality, Duration, Modifying Factors, Severity)  Note limiting factors.   Aracelis Anguiano is a 29 y.o. female who presents to the emergency department ab pain      Aracelis Anguiano is a 29 y.o. female G1, P1 at 11 weeks gestation presented to the ED about lower abdominal pain mainly on the right lower pelvic area.  No vaginal discharge or bleeding no fever chills body ache no nausea or vomiting no other complaint or concern      The history is provided by the patient.       NursingNotes were reviewed.    REVIEW OF SYSTEMS    (2-9 systems for level 4, 10 or more for level 5)     Review of Systems    Except as noted above the remainder of the review of systems was reviewed and negative.       PAST MEDICAL HISTORY     Past Medical History:   Diagnosis Date    Asthma     Depression     Ectopic pregnancy          SURGICALHISTORY       Past Surgical History:   Procedure Laterality Date    EAR SURGERY      EAR SURGERY      TONSILLECTOMY           CURRENT MEDICATIONS       Discharge Medication List as of 4/13/2025  2:30 PM        CONTINUE these medications which have NOT CHANGED    Details   fluticasone (FLONASE) 50 MCG/ACT nasal spray SHAKE LIQUID AND USE 2 SPRAYS IN EACH NOSTRIL DAILY, Disp-16 g, R-0Normal                  Morphine    FAMILY HISTORY       Family History   Problem Relation Age of Onset    Migraines Mother     Preeclampsia Mother     Asthma Maternal Grandfather     Diabetes Maternal Grandfather     Heart Surgery Maternal Grandfather           SOCIAL HISTORY       Social History     Socioeconomic History

## 2025-04-16 ENCOUNTER — INITIAL PRENATAL (OUTPATIENT)
Dept: OBGYN CLINIC | Age: 30
End: 2025-04-16
Payer: COMMERCIAL

## 2025-04-16 VITALS
SYSTOLIC BLOOD PRESSURE: 108 MMHG | DIASTOLIC BLOOD PRESSURE: 66 MMHG | HEART RATE: 104 BPM | BODY MASS INDEX: 30.7 KG/M2 | WEIGHT: 196 LBS

## 2025-04-16 DIAGNOSIS — Z3A.11 11 WEEKS GESTATION OF PREGNANCY: Primary | ICD-10-CM

## 2025-04-16 DIAGNOSIS — O09.511 SUPERVISION OF ELDERLY PRIMIGRAVIDA IN FIRST TRIMESTER: ICD-10-CM

## 2025-04-16 PROCEDURE — G8417 CALC BMI ABV UP PARAM F/U: HCPCS | Performed by: OBSTETRICS & GYNECOLOGY

## 2025-04-16 PROCEDURE — 4004F PT TOBACCO SCREEN RCVD TLK: CPT | Performed by: OBSTETRICS & GYNECOLOGY

## 2025-04-16 PROCEDURE — G8427 DOCREV CUR MEDS BY ELIG CLIN: HCPCS | Performed by: OBSTETRICS & GYNECOLOGY

## 2025-04-16 PROCEDURE — 99213 OFFICE O/P EST LOW 20 MIN: CPT | Performed by: OBSTETRICS & GYNECOLOGY

## 2025-04-16 NOTE — PROGRESS NOTES
Initial OB visit      Aracelis Anguiano is a 29 y.o. year old female  @ L 25, 11.5 wks , JACQUE 10/31/25  confirmed by 6 wk scan . Complaints : nausea occasional vomiting .     POB: SAB             Right tubal ectopic      PGYN: as above     PMH: Obesity BMI 31     PSH: tonsillectomy , adenoidectomy , ear surgery .     MEDS: PNV     Drug Allergies:   Allergies   Allergen Reactions    Morphine Itching       SOCHX: smoking 2-3 ciggs/day  , THC .     FH: Mother or Father , DM No , HTN No, Other No    /66   Pulse (!) 104   Wt 88.9 kg (196 lb)   LMP 2025 (Approximate)   BMI 30.70 kg/m²   Past Medical History:   Diagnosis Date    Asthma     Depression     Ectopic pregnancy      Past Surgical History:   Procedure Laterality Date    EAR SURGERY      EAR SURGERY      TONSILLECTOMY           Review of Systems  Constitutional: negative  Genitourinary:see above  Integument/breast: negative  Behavioral/Psych: negative  Endocrine: negative     All other systems reviewed and are negative.       Physical Exam:  /66   Pulse (!) 104   Wt 88.9 kg (196 lb)   LMP 2025 (Approximate)   BMI 30.70 kg/m²   Skin: Warm, dry, no lesions or rashes  Extremities: Without clubbing, cyanosis and edema. Palms and nails are normal. Ambulates without difficulty  Neurological: No gross sensory or motor deficits.  Abdomen: Soft, non-tender without masses or organomegaly  bowel sounds normoactive    HOF : not palpable     FHT : 150 bpm     Assessment:   1. 11 weeks gestation of pregnancy    2. Supervision of elderly primigravida in first trimester          Plan:   Orders Placed This Encounter   Procedures    [facility] Prenatal Test     ==========Department Information==========  ID: [id number]  Department:[facility] [place] [facility], [facility] OBSTETRICS AND GYNECOLOGY  [address]  [facility] [address]  Dept: [phone]  Dept Fax: [phone]  Loc: [phone]     What type of

## 2025-04-22 LAB
Lab: NORMAL
NTRA FETAL FRACTION: NORMAL
NTRA GENDER OF FETUS: NORMAL
NTRA MONOSOMY X AGE-BASED RISK TEXT: NORMAL
NTRA MONOSOMY X RESULT TEXT: NORMAL
NTRA MONOSOMY X RISK SCORE TEXT: NORMAL
NTRA TRIPLOIDY RESULT TEXT: NORMAL
NTRA TRISOMY 13 AGE-BASED RISK TEXT: NORMAL
NTRA TRISOMY 13 RESULT TEXT: NORMAL
NTRA TRISOMY 13 RISK SCORE TEXT: NORMAL
NTRA TRISOMY 18 AGE-BASED RISK TEXT: NORMAL
NTRA TRISOMY 18 RESULT TEXT: NORMAL
NTRA TRISOMY 18 RISK SCORE TEXT: NORMAL
NTRA TRISOMY 21 AGE-BASED RISK TEXT: NORMAL
NTRA TRISOMY 21 RESULT TEXT: NORMAL
NTRA TRISOMY 21 RISK SCORE TEXT: NORMAL

## 2025-04-27 ENCOUNTER — APPOINTMENT (OUTPATIENT)
Dept: CARDIOLOGY | Facility: HOSPITAL | Age: 30
End: 2025-04-27
Payer: COMMERCIAL

## 2025-04-27 ENCOUNTER — HOSPITAL ENCOUNTER (EMERGENCY)
Facility: HOSPITAL | Age: 30
Discharge: HOME | End: 2025-04-28
Attending: STUDENT IN AN ORGANIZED HEALTH CARE EDUCATION/TRAINING PROGRAM
Payer: COMMERCIAL

## 2025-04-27 DIAGNOSIS — R55 PRE-SYNCOPE: Primary | ICD-10-CM

## 2025-04-27 DIAGNOSIS — R51.9 NONINTRACTABLE HEADACHE, UNSPECIFIED CHRONICITY PATTERN, UNSPECIFIED HEADACHE TYPE: ICD-10-CM

## 2025-04-27 LAB
ALBUMIN SERPL BCP-MCNC: 3.9 G/DL (ref 3.4–5)
ALP SERPL-CCNC: 41 U/L (ref 33–110)
ALT SERPL W P-5'-P-CCNC: 23 U/L (ref 7–45)
ANION GAP SERPL CALC-SCNC: 12 MMOL/L (ref 10–20)
AST SERPL W P-5'-P-CCNC: 16 U/L (ref 9–39)
BASOPHILS # BLD AUTO: 0.02 X10*3/UL (ref 0–0.1)
BASOPHILS NFR BLD AUTO: 0.2 %
BILIRUB SERPL-MCNC: 0.3 MG/DL (ref 0–1.2)
BUN SERPL-MCNC: 6 MG/DL (ref 6–23)
CALCIUM SERPL-MCNC: 9.2 MG/DL (ref 8.6–10.3)
CARDIAC TROPONIN I PNL SERPL HS: <3 NG/L (ref 0–13)
CHLORIDE SERPL-SCNC: 105 MMOL/L (ref 98–107)
CO2 SERPL-SCNC: 21 MMOL/L (ref 21–32)
CREAT SERPL-MCNC: 0.41 MG/DL (ref 0.5–1.05)
EGFRCR SERPLBLD CKD-EPI 2021: >90 ML/MIN/1.73M*2
EOSINOPHIL # BLD AUTO: 0.08 X10*3/UL (ref 0–0.7)
EOSINOPHIL NFR BLD AUTO: 0.7 %
ERYTHROCYTE [DISTWIDTH] IN BLOOD BY AUTOMATED COUNT: 12.4 % (ref 11.5–14.5)
GLUCOSE SERPL-MCNC: 94 MG/DL (ref 74–99)
HCT VFR BLD AUTO: 35.7 % (ref 36–46)
HGB BLD-MCNC: 12.6 G/DL (ref 12–16)
IMM GRANULOCYTES # BLD AUTO: 0.04 X10*3/UL (ref 0–0.7)
IMM GRANULOCYTES NFR BLD AUTO: 0.4 % (ref 0–0.9)
LYMPHOCYTES # BLD AUTO: 1.75 X10*3/UL (ref 1.2–4.8)
LYMPHOCYTES NFR BLD AUTO: 15.4 %
MAGNESIUM SERPL-MCNC: 1.67 MG/DL (ref 1.6–2.4)
MCH RBC QN AUTO: 29.8 PG (ref 26–34)
MCHC RBC AUTO-ENTMCNC: 35.3 G/DL (ref 32–36)
MCV RBC AUTO: 84 FL (ref 80–100)
MONOCYTES # BLD AUTO: 0.61 X10*3/UL (ref 0.1–1)
MONOCYTES NFR BLD AUTO: 5.4 %
NEUTROPHILS # BLD AUTO: 8.83 X10*3/UL (ref 1.2–7.7)
NEUTROPHILS NFR BLD AUTO: 77.9 %
NRBC BLD-RTO: 0 /100 WBCS (ref 0–0)
PLATELET # BLD AUTO: 192 X10*3/UL (ref 150–450)
POTASSIUM SERPL-SCNC: 3.7 MMOL/L (ref 3.5–5.3)
PROT SERPL-MCNC: 6.8 G/DL (ref 6.4–8.2)
RBC # BLD AUTO: 4.23 X10*6/UL (ref 4–5.2)
SODIUM SERPL-SCNC: 134 MMOL/L (ref 136–145)
WBC # BLD AUTO: 11.3 X10*3/UL (ref 4.4–11.3)

## 2025-04-27 PROCEDURE — 36415 COLL VENOUS BLD VENIPUNCTURE: CPT | Performed by: STUDENT IN AN ORGANIZED HEALTH CARE EDUCATION/TRAINING PROGRAM

## 2025-04-27 PROCEDURE — 99284 EMERGENCY DEPT VISIT MOD MDM: CPT | Performed by: STUDENT IN AN ORGANIZED HEALTH CARE EDUCATION/TRAINING PROGRAM

## 2025-04-27 PROCEDURE — 93005 ELECTROCARDIOGRAM TRACING: CPT

## 2025-04-27 PROCEDURE — 84484 ASSAY OF TROPONIN QUANT: CPT | Performed by: STUDENT IN AN ORGANIZED HEALTH CARE EDUCATION/TRAINING PROGRAM

## 2025-04-27 PROCEDURE — 80053 COMPREHEN METABOLIC PANEL: CPT | Performed by: STUDENT IN AN ORGANIZED HEALTH CARE EDUCATION/TRAINING PROGRAM

## 2025-04-27 PROCEDURE — 85025 COMPLETE CBC W/AUTO DIFF WBC: CPT | Performed by: STUDENT IN AN ORGANIZED HEALTH CARE EDUCATION/TRAINING PROGRAM

## 2025-04-27 PROCEDURE — 2500000001 HC RX 250 WO HCPCS SELF ADMINISTERED DRUGS (ALT 637 FOR MEDICARE OP): Performed by: STUDENT IN AN ORGANIZED HEALTH CARE EDUCATION/TRAINING PROGRAM

## 2025-04-27 PROCEDURE — 83735 ASSAY OF MAGNESIUM: CPT | Performed by: STUDENT IN AN ORGANIZED HEALTH CARE EDUCATION/TRAINING PROGRAM

## 2025-04-27 RX ORDER — ACETAMINOPHEN 325 MG/1
975 TABLET ORAL ONCE
Status: COMPLETED | OUTPATIENT
Start: 2025-04-27 | End: 2025-04-27

## 2025-04-27 RX ADMIN — ACETAMINOPHEN 975 MG: 325 TABLET ORAL at 22:56

## 2025-04-27 ASSESSMENT — PAIN DESCRIPTION - ORIENTATION: ORIENTATION: OTHER (COMMENT)

## 2025-04-27 ASSESSMENT — PAIN DESCRIPTION - FREQUENCY: FREQUENCY: CONSTANT/CONTINUOUS

## 2025-04-27 ASSESSMENT — PAIN - FUNCTIONAL ASSESSMENT: PAIN_FUNCTIONAL_ASSESSMENT: 0-10

## 2025-04-27 ASSESSMENT — PAIN DESCRIPTION - LOCATION: LOCATION: HEAD

## 2025-04-27 ASSESSMENT — PAIN DESCRIPTION - DESCRIPTORS: DESCRIPTORS: ACHING

## 2025-04-27 ASSESSMENT — PAIN SCALES - GENERAL: PAINLEVEL_OUTOF10: 5 - MODERATE PAIN

## 2025-04-27 ASSESSMENT — PAIN DESCRIPTION - PAIN TYPE: TYPE: ACUTE PAIN

## 2025-04-28 VITALS
SYSTOLIC BLOOD PRESSURE: 106 MMHG | TEMPERATURE: 98.4 F | DIASTOLIC BLOOD PRESSURE: 76 MMHG | BODY MASS INDEX: 31.82 KG/M2 | HEART RATE: 80 BPM | HEIGHT: 66 IN | WEIGHT: 198 LBS | OXYGEN SATURATION: 96 % | RESPIRATION RATE: 16 BRPM

## 2025-04-28 LAB
ATRIAL RATE: 80 BPM
CARDIAC TROPONIN I PNL SERPL HS: <3 NG/L (ref 0–13)
P AXIS: 22 DEGREES
P OFFSET: 188 MS
P ONSET: 142 MS
PR INTERVAL: 158 MS
Q ONSET: 221 MS
QRS COUNT: 13 BEATS
QRS DURATION: 86 MS
QT INTERVAL: 366 MS
QTC CALCULATION(BAZETT): 422 MS
QTC FREDERICIA: 403 MS
R AXIS: -6 DEGREES
T AXIS: 27 DEGREES
T OFFSET: 404 MS
VENTRICULAR RATE: 80 BPM

## 2025-04-28 NOTE — ED PROVIDER NOTES
HPI: The patient is a 29-year-old -0-2-0 who presents to the Emergency Department with a chief complaint of pain like she was about to pass out.  Patient reports that she was in her normal state of health when she developed sudden lightheadedness, flushing, as well as feel like the room was closing in.  She also reported some nausea during this episode.  She reports she stood up to go to the bathroom and then she felt more dizzy and a friend helped her sit down on the ground.  She did not strike her head or lose consciousness.  She reports that afterwards she noticed that she had a moderate frontal headache.  She reports has had headaches like this before.  Headache was not sudden onset or maximal in onset she has no numbness tingling weakness neck stiffness or visual changes.  Patient reports some persistent mild nausea.  She had a normal diet today.  No diarrhea.  No vaginal bleeding or discharge.  Reports no family history of heart disease at young age no family history of unexplained death at a young age.  No chest pain or shortness of breath.     PAST MEDICAL HISTORY:  as per HPI  ALLERGIES:  as per HPI  MEDICATIONS:  as per HPI  FAMILY HISTORY: as per HPI  SURGICAL HISTORY: as per HPI  SOCIAL HISTORY: as per HPI     PHYSICAL EXAM:  VITAL SIGNS: Nursing notes reviewed.  GENERAL:  Alert and interactive  EYES:   Eyes track.  ENT:  Airway patent.  RESPIRATORY:  Nonlabored breathing.  Clear bilaterally  CARDIOVASCULAR:  [Regular rate.] [Regular rhythm.]  No murmurs.  Radial pulses equal bilaterally.  GASTROINTESTINAL:  No distension.  No abdominal tenderness.  Gravid.  MUSCULOSKELETAL:  No deformity.  No calf tenderness or swelling  NEUROLOGICAL: Tracks with eyes, PERRL, EOMI, equal sensation in V1-V3, no facial droop, sounds equal in both ears, 5/5 w/ strength shoulder raise, tongue protrudes at midline, soft palate raises symmetrically 5/5 strength in UE and LE, no deficit with light touch, normal finger to  nose and heel to ambriz, negative Romberg, normal gait. NIH 0  SKIN:  Dry.        MEDICAL DECISION MAKING (MDM):     DIAGNOSTIC STUDIES  Labs: CBC, CMP, Agnesian level, troponin     EKG  Per my interpretation:  Electrocardiogram ECG  RATE:  [Normal]  RHYTHM: [Sinus]  AXIS: [Normal]  INTERVALS: [Normal]  ST-T WAVE CHANGES: [Normal]  ABNORMALITIES/COMPARISON: No previous EKG to compare with.       REVIEW OF OLD RECORDS: Reviewed outpatient progress note from 4/16/2025 as well as OB ultrasound from 4/13/2025     SUMMARY:  The patient is admitted to the Emergency Department for evaluation of above. Complete history and physical examination was performed by me.  Patient presents with symptoms most consistent with a vasovagal episode.  Low suspicion for stroke given the reassuring neurologic exam.  Vitals not consistent with preeclampsia and the patient is quite early in her pregnancy which points way from this as well.  She reported a mild headache but the headache was not sudden onset or maximal in onset and not the worst headache of her life appointment from subarachnoid hemorrhage.  Given the time course lower suspicion for dural venous thrombus.  Doubt cavernous sinus thrombus.  Doubt meningitis encephalitis.  Doubt giant cell arteritis.  No else has similar symptoms pointing away from carbon monoxide poisoning.  She was given Tylenol for analgesia.  Given minimal risk factors for arrhythmia and given the clear prodrome, this points towards a vagal episode.  I think arrhythmia is unlikely. EKG shows sinus rhythm with no significant interval abnormalities such as QTC prolongation or WPW. There are no findings to suggest Brugada syndrome.  Hypertrophic cardiomyopathy was considered but there is no clear historical elements pointing towards this. EKG is not suggestive since the QRS voltage is not extremely large and there is no suggestive Q waves.  Blood work was obtained which showed negative cardiac enzymes pointing  went from myocarditis and occlusive my.  No evidence of renal failure or clinically significant electrolyte disturbance.  While patient is pregnant, she had an ultrasound showing an IUP few weeks back so doubt ectopic pregnancy.  She does not have risk factors for heterotopic pregnancy.  Patient has no chest pain or shortness of breath so doubt pulmonary embolism.  I discussed the exact etiology of her presentation is unclear but my suspicion for life-threatening pathology is low.  I discussed the diagnostic uncertainty with her and recommend a low threshold return with new or worsening symptoms.  The work-up and plan were discussed with the patient/caregiver and questions were answered regarding the ED visit.  Educational materials were provided as well as return precautions including returning for any persisting or worsening symptoms.  Patient was recommended to follow-up with PCP in the next few days in addition to any potential specialists that were discussed.  The patient/family expressed understanding and agreed to the described plan.  Patient was discharged in stable condition.     DIAGNOSIS:    Presyncope  Headache     DISPOSITION:    1) discharged     Otoniel Sr MD  04/28/25 0048

## 2025-05-06 ENCOUNTER — ROUTINE PRENATAL (OUTPATIENT)
Dept: OBGYN CLINIC | Age: 30
End: 2025-05-06
Payer: COMMERCIAL

## 2025-05-06 VITALS
BODY MASS INDEX: 30.7 KG/M2 | HEART RATE: 92 BPM | WEIGHT: 196 LBS | SYSTOLIC BLOOD PRESSURE: 112 MMHG | DIASTOLIC BLOOD PRESSURE: 76 MMHG

## 2025-05-06 DIAGNOSIS — Z3A.14 14 WEEKS GESTATION OF PREGNANCY: Primary | ICD-10-CM

## 2025-05-06 DIAGNOSIS — R55 SYNCOPE, UNSPECIFIED SYNCOPE TYPE: ICD-10-CM

## 2025-05-06 PROCEDURE — G8417 CALC BMI ABV UP PARAM F/U: HCPCS | Performed by: OBSTETRICS & GYNECOLOGY

## 2025-05-06 PROCEDURE — 99213 OFFICE O/P EST LOW 20 MIN: CPT | Performed by: OBSTETRICS & GYNECOLOGY

## 2025-05-06 PROCEDURE — G8427 DOCREV CUR MEDS BY ELIG CLIN: HCPCS | Performed by: OBSTETRICS & GYNECOLOGY

## 2025-05-06 PROCEDURE — 4004F PT TOBACCO SCREEN RCVD TLK: CPT | Performed by: OBSTETRICS & GYNECOLOGY

## 2025-05-06 NOTE — PROGRESS NOTES
OB visit      Aracelis Anguiano is a 29 y.o. year old female  @ L 25, 14.4 wks , JACQUE 10/31/25  confirmed by 6 wk scan . Complaints : nausea occasional vomiting .     POB: SAB             Right tubal ectopic      PGYN: as above     PMH: Obesity BMI 31     PSH: tonsillectomy , adenoidectomy , ear surgery .     MEDS: PNV     Drug Allergies:   Allergies   Allergen Reactions    Morphine Itching       SOCHX: smoking 2-3 ciggs/day  , THC .     FH: Mother or Father , DM No , HTN No, Other No    /76   Pulse 92   Wt 88.9 kg (196 lb)   LMP 2025 (Approximate)   BMI 30.70 kg/m²   Past Medical History:   Diagnosis Date    Asthma     Depression     Ectopic pregnancy      Past Surgical History:   Procedure Laterality Date    EAR SURGERY      EAR SURGERY      TONSILLECTOMY           Review of Systems  Constitutional: negative  Genitourinary:see above  Integument/breast: negative  Behavioral/Psych: negative  Endocrine: negative     All other systems reviewed and are negative.       Physical Exam:  /76   Pulse 92   Wt 88.9 kg (196 lb)   LMP 2025 (Approximate)   BMI 30.70 kg/m²   Skin: Warm, dry, no lesions or rashes  Extremities: Without clubbing, cyanosis and edema. Palms and nails are normal. Ambulates without difficulty  Neurological: No gross sensory or motor deficits.  Abdomen: Soft, non-tender without masses or organomegaly  bowel sounds normoactive    HOF : suprapubic     FHT : 150 bpm     Assessment:   1. 14 weeks gestation of pregnancy    2. Syncope, unspecified syncope type            Plan:     25: Patient complains of \" passing out\" twice since her last visit, with no prior history   Discussed blood pressure changes in pregnancy including drop in BP especially in second trimester , advised increased hydration and increased salt intake , with balanced nutritious diet . Advised if episodes become more frequent to go to ED to rule out other causes .     No orders of the defined

## 2025-05-26 ENCOUNTER — HOSPITAL ENCOUNTER (EMERGENCY)
Facility: HOSPITAL | Age: 30
Discharge: HOME | End: 2025-05-26
Attending: EMERGENCY MEDICINE
Payer: COMMERCIAL

## 2025-05-26 VITALS
TEMPERATURE: 97.5 F | DIASTOLIC BLOOD PRESSURE: 81 MMHG | OXYGEN SATURATION: 99 % | RESPIRATION RATE: 16 BRPM | WEIGHT: 200 LBS | HEART RATE: 81 BPM | BODY MASS INDEX: 31.39 KG/M2 | SYSTOLIC BLOOD PRESSURE: 136 MMHG | HEIGHT: 67 IN

## 2025-05-26 DIAGNOSIS — R82.71 BACTERIA IN URINE: ICD-10-CM

## 2025-05-26 DIAGNOSIS — O26.892 ABDOMINAL PAIN DURING PREGNANCY IN SECOND TRIMESTER (HHS-HCC): Primary | ICD-10-CM

## 2025-05-26 DIAGNOSIS — R10.9 ABDOMINAL PAIN DURING PREGNANCY IN SECOND TRIMESTER (HHS-HCC): Primary | ICD-10-CM

## 2025-05-26 DIAGNOSIS — K59.00 CONSTIPATION, UNSPECIFIED CONSTIPATION TYPE: ICD-10-CM

## 2025-05-26 LAB
ALBUMIN SERPL BCP-MCNC: 3.7 G/DL (ref 3.4–5)
ALP SERPL-CCNC: 43 U/L (ref 33–110)
ALT SERPL W P-5'-P-CCNC: 32 U/L (ref 7–45)
ANION GAP SERPL CALC-SCNC: 10 MMOL/L (ref 10–20)
APPEARANCE UR: CLEAR
AST SERPL W P-5'-P-CCNC: 20 U/L (ref 9–39)
BACTERIA #/AREA URNS AUTO: ABNORMAL /HPF
BASOPHILS # BLD AUTO: 0.01 X10*3/UL (ref 0–0.1)
BASOPHILS NFR BLD AUTO: 0.1 %
BILIRUB SERPL-MCNC: 0.4 MG/DL (ref 0–1.2)
BILIRUB UR STRIP.AUTO-MCNC: NEGATIVE MG/DL
BUN SERPL-MCNC: 8 MG/DL (ref 6–23)
CALCIUM SERPL-MCNC: 9 MG/DL (ref 8.6–10.3)
CHLORIDE SERPL-SCNC: 104 MMOL/L (ref 98–107)
CO2 SERPL-SCNC: 25 MMOL/L (ref 21–32)
COLOR UR: ABNORMAL
CREAT SERPL-MCNC: 0.56 MG/DL (ref 0.5–1.05)
CRP SERPL-MCNC: 0.71 MG/DL
EGFRCR SERPLBLD CKD-EPI 2021: >90 ML/MIN/1.73M*2
EOSINOPHIL # BLD AUTO: 0.09 X10*3/UL (ref 0–0.7)
EOSINOPHIL NFR BLD AUTO: 1.3 %
ERYTHROCYTE [DISTWIDTH] IN BLOOD BY AUTOMATED COUNT: 12.9 % (ref 11.5–14.5)
GLUCOSE SERPL-MCNC: 82 MG/DL (ref 74–99)
GLUCOSE UR STRIP.AUTO-MCNC: NORMAL MG/DL
HCT VFR BLD AUTO: 31.9 % (ref 36–46)
HGB BLD-MCNC: 11.2 G/DL (ref 12–16)
IMM GRANULOCYTES # BLD AUTO: 0.01 X10*3/UL (ref 0–0.7)
IMM GRANULOCYTES NFR BLD AUTO: 0.1 % (ref 0–0.9)
KETONES UR STRIP.AUTO-MCNC: NEGATIVE MG/DL
LEUKOCYTE ESTERASE UR QL STRIP.AUTO: ABNORMAL
LYMPHOCYTES # BLD AUTO: 2.01 X10*3/UL (ref 1.2–4.8)
LYMPHOCYTES NFR BLD AUTO: 28.8 %
MCH RBC QN AUTO: 29.9 PG (ref 26–34)
MCHC RBC AUTO-ENTMCNC: 35.1 G/DL (ref 32–36)
MCV RBC AUTO: 85 FL (ref 80–100)
MONOCYTES # BLD AUTO: 0.5 X10*3/UL (ref 0.1–1)
MONOCYTES NFR BLD AUTO: 7.2 %
MUCOUS THREADS #/AREA URNS AUTO: ABNORMAL /LPF
NEUTROPHILS # BLD AUTO: 4.37 X10*3/UL (ref 1.2–7.7)
NEUTROPHILS NFR BLD AUTO: 62.5 %
NITRITE UR QL STRIP.AUTO: NEGATIVE
NRBC BLD-RTO: 0 /100 WBCS (ref 0–0)
PH UR STRIP.AUTO: 6 [PH]
PLATELET # BLD AUTO: 196 X10*3/UL (ref 150–450)
POTASSIUM SERPL-SCNC: 3.8 MMOL/L (ref 3.5–5.3)
PROT SERPL-MCNC: 6.3 G/DL (ref 6.4–8.2)
PROT UR STRIP.AUTO-MCNC: NEGATIVE MG/DL
RBC # BLD AUTO: 3.75 X10*6/UL (ref 4–5.2)
RBC # UR STRIP.AUTO: NEGATIVE MG/DL
RBC #/AREA URNS AUTO: ABNORMAL /HPF
SODIUM SERPL-SCNC: 135 MMOL/L (ref 136–145)
SP GR UR STRIP.AUTO: 1.02
SQUAMOUS #/AREA URNS AUTO: ABNORMAL /HPF
UROBILINOGEN UR STRIP.AUTO-MCNC: NORMAL MG/DL
WBC # BLD AUTO: 7 X10*3/UL (ref 4.4–11.3)
WBC #/AREA URNS AUTO: ABNORMAL /HPF

## 2025-05-26 PROCEDURE — 2500000001 HC RX 250 WO HCPCS SELF ADMINISTERED DRUGS (ALT 637 FOR MEDICARE OP): Performed by: EMERGENCY MEDICINE

## 2025-05-26 PROCEDURE — 36415 COLL VENOUS BLD VENIPUNCTURE: CPT | Performed by: EMERGENCY MEDICINE

## 2025-05-26 PROCEDURE — 87086 URINE CULTURE/COLONY COUNT: CPT | Mod: ELYLAB | Performed by: EMERGENCY MEDICINE

## 2025-05-26 PROCEDURE — 96361 HYDRATE IV INFUSION ADD-ON: CPT

## 2025-05-26 PROCEDURE — 80053 COMPREHEN METABOLIC PANEL: CPT | Performed by: EMERGENCY MEDICINE

## 2025-05-26 PROCEDURE — 2500000004 HC RX 250 GENERAL PHARMACY W/ HCPCS (ALT 636 FOR OP/ED): Mod: JZ | Performed by: EMERGENCY MEDICINE

## 2025-05-26 PROCEDURE — 99284 EMERGENCY DEPT VISIT MOD MDM: CPT | Mod: 25 | Performed by: EMERGENCY MEDICINE

## 2025-05-26 PROCEDURE — 86140 C-REACTIVE PROTEIN: CPT | Performed by: EMERGENCY MEDICINE

## 2025-05-26 PROCEDURE — 81001 URINALYSIS AUTO W/SCOPE: CPT | Performed by: EMERGENCY MEDICINE

## 2025-05-26 PROCEDURE — 85025 COMPLETE CBC W/AUTO DIFF WBC: CPT | Performed by: EMERGENCY MEDICINE

## 2025-05-26 PROCEDURE — 96360 HYDRATION IV INFUSION INIT: CPT

## 2025-05-26 RX ORDER — DOCUSATE SODIUM 100 MG/1
100 CAPSULE, LIQUID FILLED ORAL 2 TIMES DAILY PRN
Qty: 14 CAPSULE | Refills: 0 | Status: SHIPPED | OUTPATIENT
Start: 2025-05-26 | End: 2025-06-02

## 2025-05-26 RX ORDER — ACETAMINOPHEN 500 MG
1000 TABLET ORAL EVERY 8 HOURS PRN
Qty: 30 TABLET | Refills: 0 | Status: SHIPPED | OUTPATIENT
Start: 2025-05-26 | End: 2025-06-05

## 2025-05-26 RX ORDER — CEPHALEXIN 500 MG/1
500 CAPSULE ORAL 2 TIMES DAILY
Qty: 10 CAPSULE | Refills: 0 | Status: SHIPPED | OUTPATIENT
Start: 2025-05-26 | End: 2025-05-31

## 2025-05-26 RX ORDER — ACETAMINOPHEN 325 MG/1
1000 TABLET ORAL ONCE
Status: COMPLETED | OUTPATIENT
Start: 2025-05-26 | End: 2025-05-26

## 2025-05-26 RX ADMIN — ACETAMINOPHEN 975 MG: 325 TABLET ORAL at 16:49

## 2025-05-26 RX ADMIN — SODIUM CHLORIDE 1000 ML: 0.9 INJECTION, SOLUTION INTRAVENOUS at 15:05

## 2025-05-26 ASSESSMENT — LIFESTYLE VARIABLES
HAVE PEOPLE ANNOYED YOU BY CRITICIZING YOUR DRINKING: NO
EVER FELT BAD OR GUILTY ABOUT YOUR DRINKING: NO
EVER HAD A DRINK FIRST THING IN THE MORNING TO STEADY YOUR NERVES TO GET RID OF A HANGOVER: NO
HAVE YOU EVER FELT YOU SHOULD CUT DOWN ON YOUR DRINKING: NO
TOTAL SCORE: 0

## 2025-05-26 ASSESSMENT — PAIN SCALES - GENERAL: PAINLEVEL_OUTOF10: 7

## 2025-05-26 ASSESSMENT — PAIN - FUNCTIONAL ASSESSMENT: PAIN_FUNCTIONAL_ASSESSMENT: 0-10

## 2025-05-26 NOTE — DISCHARGE INSTRUCTIONS
Follow-up with your primary care doctor and OB/GYN.  Return to the emergency department if symptoms worsen or change.  Take medications as prescribed.

## 2025-05-26 NOTE — ED PROVIDER NOTES
29-year-old female presents emergency department with chief complaint of lower abdominal pain that she describes as a cramping soreness.  She reports her symptoms have been ongoing for a couple of days.  Denies any fevers or congestion.  Patient does admit to cough.  No chest pain or difficulty breathing.  Patient denies any nausea, vomiting, dysuria, diarrhea, or constipation.  She denies any vaginal symptoms such as leaking of fluid or vaginal bleeding.  She does admit to round ligament pain in the past, but states this pain is more persistent and across to her lower abdomen.  No report of injury or trauma. Patient denies any current issues with her pregnancy.  She is a  at 17 weeks.  She has history of previous ectopic.  Chart review shows history of gastritis, hidradenitis, tobacco use, and UTI.  She is not on any blood thinning medications.  Chart review shows patient has had ultrasound with single normal-appearing gestational sac at 11 weeks on 2025.  Based on previous ultrasound patient is 17 weeks 2 days.      History provided by:  Patient   used: No           ------------------------------------------------------------------------------------------------------------------------------------------    VS: As documented in the triage note and EMR flowsheet from this visit were reviewed.    Review of Systems  Constitutional: no fever, chills  Eyes: no redness, discharge, pain  HENT: no sore throat, nose bleeds, congestion, rhinorrhea   Cardiovascular: no chest pain, leg edema, palpitations  Respiratory: no shortness of breath, cough, wheezing  GI: no nausea, diarrhea, vomiting, constipation, BRBPR, melena reports lower abdominal cramping pain  : no dysuria, frequency, hematuria, vaginal symptoms admits to pregnancy 17 weeks  Musculoskeletal: no neck pain, stiffness,  no joint deformity, swelling  Skin: no rash, erythema, wounds  Neurological: no headache, dizziness,  lightheadedness, weakness, numbness, or tingling  Psychiatric: no suicidal thoughts, confusion, agitation  Metabolic: no polyuria or polydipsia  Hematologic: no increased bleeding or bruising  Immunology: No immunocompromise state    [facility]  Nursing notes reviewed and confirmed by me.  Chart review performed including medications, allergies, and medical, surgical, and family history  Visit Vitals  /81 (BP Location: Left arm, Patient Position: Lying)   Pulse 81   Temp 36.4 °C (97.5 °F) (Temporal)   Resp 16     Physical Exam  Vitals and nursing note reviewed.   Constitutional:       General: She is not in acute distress.     Appearance: Normal appearance. She is not ill-appearing.   HENT:      Head: Normocephalic and atraumatic.      Right Ear: External ear normal.      Left Ear: External ear normal.      Nose: Nose normal. No congestion or rhinorrhea.      Mouth/Throat:      Mouth: Mucous membranes are moist.      Pharynx: No oropharyngeal exudate or posterior oropharyngeal erythema.   Eyes:      Extraocular Movements: Extraocular movements intact.      Conjunctiva/sclera: Conjunctivae normal.      Pupils: Pupils are equal, round, and reactive to light.   Cardiovascular:      Rate and Rhythm: Normal rate and regular rhythm.      Pulses: Normal pulses.      Heart sounds: Normal heart sounds.   Pulmonary:      Effort: Pulmonary effort is normal. No respiratory distress.      Breath sounds: Normal breath sounds. No stridor. No wheezing, rhonchi or rales.   Abdominal:      General: There is no distension.      Palpations: Abdomen is soft.      Tenderness: There is abdominal tenderness (Mild diffuse lower abdominal pain with deep palpation). There is no guarding or rebound.   Musculoskeletal:         General: No swelling or deformity. Normal range of motion.      Cervical back: Normal range of motion and neck supple. No rigidity.      Right lower leg: No edema.      Left lower leg: No edema.      Comments: No calf  tenderness    Skin:     General: Skin is warm and dry.      Capillary Refill: Capillary refill takes less than 2 seconds.      Coloration: Skin is not jaundiced.      Findings: No rash.   Neurological:      General: No focal deficit present.      Mental Status: She is alert and oriented to person, place, and time.      Sensory: No sensory deficit.      Motor: No weakness.   Psychiatric:         Mood and Affect: Mood normal.         Behavior: Behavior normal.        Medical History[1]   Surgical History[2]   Social History[3]   ------------------------------------------------------------------------------------------------------------------------------------------  No orders to display      Labs Reviewed   CBC WITH AUTO DIFFERENTIAL - Abnormal       Result Value    WBC 7.0      nRBC 0.0      RBC 3.75 (*)     Hemoglobin 11.2 (*)     Hematocrit 31.9 (*)     MCV 85      MCH 29.9      MCHC 35.1      RDW 12.9      Platelets 196      Neutrophils % 62.5      Immature Granulocytes %, Automated 0.1      Lymphocytes % 28.8      Monocytes % 7.2      Eosinophils % 1.3      Basophils % 0.1      Neutrophils Absolute 4.37      Immature Granulocytes Absolute, Automated 0.01      Lymphocytes Absolute 2.01      Monocytes Absolute 0.50      Eosinophils Absolute 0.09      Basophils Absolute 0.01     COMPREHENSIVE METABOLIC PANEL - Abnormal    Glucose 82      Sodium 135 (*)     Potassium 3.8      Chloride 104      Bicarbonate 25      Anion Gap 10      Urea Nitrogen 8      Creatinine 0.56      eGFR >90      Calcium 9.0      Albumin 3.7      Alkaline Phosphatase 43      Total Protein 6.3 (*)     AST 20      Bilirubin, Total 0.4      ALT 32     URINALYSIS WITH REFLEX CULTURE AND MICROSCOPIC - Abnormal    Color, Urine Light-Yellow      Appearance, Urine Clear      Specific Gravity, Urine 1.023      pH, Urine 6.0      Protein, Urine NEGATIVE      Glucose, Urine Normal      Blood, Urine NEGATIVE      Ketones, Urine NEGATIVE      Bilirubin,  Urine NEGATIVE      Urobilinogen, Urine Normal      Nitrite, Urine NEGATIVE      Leukocyte Esterase, Urine 25 Jesus/uL (*)    MICROSCOPIC ONLY, URINE - Abnormal    WBC, Urine 1-5      RBC, Urine NONE      Squamous Epithelial Cells, Urine 1-9 (SPARSE)      Bacteria, Urine 1+ (*)     Mucus, Urine FEW     C-REACTIVE PROTEIN - Normal    C-Reactive Protein 0.71     URINE CULTURE   URINALYSIS WITH REFLEX CULTURE AND MICROSCOPIC    Narrative:     The following orders were created for panel order Urinalysis with Reflex Culture and Microscopic.  Procedure                               Abnormality         Status                     ---------                               -----------         ------                     Urinalysis with Reflex C...[[id number]]  Abnormal            Final result               Extra Urine Gray Tube[[id number]]                            In process                   Please view results for these tests on the individual orders.   EXTRA URINE GRAY TUBE        Medical Decision Making  29-year-old female presents emergency department with chief complaint of diffuse lower abdominal pain and cramping.  She reports significant history of 17-week pregnancy.  She has had confirmed intrauterine pregnancy via ultrasound.  She denies any vaginal symptoms.  Given her presenting complaints a thorough workup is obtained.  On my exam abdomen is benign without rebound, rigidity, guarding, or distention.  She is treated symptomatically with Tylenol and IV fluids.  Repeat abdominal exams continued to be benign.  She is not have any point tenderness at McBurney's point.  UA does not show is finding infection, but does show bacteriuria due to her pregnancy she is given Keflex for home.  Cultures are obtained.  CRP within normal range.  CBC does not show significant leukocytosis and shows mild anemia.  Patient does not have findings of sepsis.  I have low suspicion for acute bacterial infection of the patient's abdomen given  these reassuring labs and abdominal exam.  Given the reassuring labs I do not feel patient would benefit from imaging of her abdomen.  I did discuss risk of radiation versus benefit.  She is in agreement with this plan.  CMP shows no significant metabolic abnormality.  Fetal heart tones were obtained by myself with bedside ultrasound.  I appreciated cardiac activity and measured fetal heart tones 3 separate occasions 163, 152, and 145.  I also appreciated fetal movements.  Discussed with patient other causes of her lower abdominal pain such as constipation or round ligament pain.  We also discussed that dehydration can cause some lower abdominal cramping with pregnancy.  Patient states she has been drinking water regularly.  She does admit to constipation the past few days and is given prescription for Colace.  She is also given Tylenol for further symptomatic treatment.  In addition, we discussed return precautions and I have recommended she follow-up with her OB/GYN early next week.  Patient is agreeable with this plan.  She is comfortable returning home.  Patient understands reasons to return to the ED.       Diagnoses as of 05/26/25 1838   Abdominal pain during pregnancy in second trimester ([facility])   Bacteria in urine   Constipation, unspecified constipation type      1. Abdominal pain during pregnancy in second trimester ([facility])  acetaminophen (Tylenol) 500 mg tablet      2. Bacteria in urine  cephalexin (Keflex) 500 mg capsule      3. Constipation, unspecified constipation type  docusate sodium (Colace) 100 mg capsule         Procedures     This note was dictated using dragon software and may contain errors related to dictation interpretation errors.        [1]   Past Medical History:  Diagnosis Date    Asthma     Encounter for examination of eyes and vision without abnormal findings     Normal eye and vision exam    Personal history of other diseases of the respiratory system     History of asthma     Personal history of other infectious and parasitic diseases     History of varicella   [2]   Past Surgical History:  Procedure Laterality Date    INCISION AND DRAINAGE OF WOUND  04/21/2016    Incision And Drainage Of Skin Abscess   [3]   Social History  Socioeconomic History    Marital status: Single   Tobacco Use    Smoking status: Every Day     Types: Cigarettes    Smokeless tobacco: Never   Vaping Use    Vaping status: Every Day   Substance and Sexual Activity    Alcohol use: Yes     Comment: socially    Drug use: Yes     Types: Marijuana     Social Drivers of Health     Financial Resource Strain: Low Risk  (10/15/2024)    Received from [facility]    Overall Financial Resource Strain (CARDIA)     Difficulty of Paying Living Expenses: Not hard at all   Food Insecurity: No Food Insecurity (1/8/2025)    Received from [facility]    Hunger Vital Sign     Worried About Running Out of Food in the Last Year: Never true     Ran Out of Food in the Last Year: Never true   Transportation Needs: No Transportation Needs (1/8/2025)    Received from [facility]    PRAPARE - Transportation     Lack of Transportation (Medical): No     Lack of Transportation (Non-Medical): No   Physical Activity: Inactive (8/29/2023)    Received from [facility]    Exercise Vital Sign     Days of Exercise per Week: 0 days     Minutes of Exercise per Session: 0 min   Intimate Partner Violence: Not At Risk (8/29/2023)    Received from [facility]    Humiliation, Afraid, Rape, and Kick questionnaire     Fear of Current or Ex-Partner: No     Emotionally Abused: No     Physically Abused: No     Sexually Abused: No        Rikki Ruggiero,   05/26/25 1838

## 2025-05-27 LAB
BACTERIA UR CULT: NO GROWTH
HOLD SPECIMEN: NORMAL

## 2025-06-03 ENCOUNTER — ROUTINE PRENATAL (OUTPATIENT)
Dept: OBGYN CLINIC | Age: 30
End: 2025-06-03
Payer: COMMERCIAL

## 2025-06-03 VITALS
SYSTOLIC BLOOD PRESSURE: 102 MMHG | DIASTOLIC BLOOD PRESSURE: 70 MMHG | BODY MASS INDEX: 30.85 KG/M2 | HEART RATE: 92 BPM | WEIGHT: 197 LBS

## 2025-06-03 DIAGNOSIS — O09.511 SUPERVISION OF ELDERLY PRIMIGRAVIDA IN FIRST TRIMESTER: Primary | ICD-10-CM

## 2025-06-03 DIAGNOSIS — Z3A.18 18 WEEKS GESTATION OF PREGNANCY: ICD-10-CM

## 2025-06-03 PROCEDURE — 99213 OFFICE O/P EST LOW 20 MIN: CPT | Performed by: OBSTETRICS & GYNECOLOGY

## 2025-06-03 PROCEDURE — G8417 CALC BMI ABV UP PARAM F/U: HCPCS | Performed by: OBSTETRICS & GYNECOLOGY

## 2025-06-03 PROCEDURE — 4004F PT TOBACCO SCREEN RCVD TLK: CPT | Performed by: OBSTETRICS & GYNECOLOGY

## 2025-06-03 PROCEDURE — G8427 DOCREV CUR MEDS BY ELIG CLIN: HCPCS | Performed by: OBSTETRICS & GYNECOLOGY

## 2025-06-03 RX ORDER — ACETAMINOPHEN 500 MG
500 TABLET ORAL PRN
COMMUNITY

## 2025-06-03 NOTE — PROGRESS NOTES
OB visit      Aracelis Anguiano is a 29 y.o. year old female  @ L 25, 18.4 wks , JACQUE 10/31/25  confirmed by 6 wk scan . Complaints : nausea occasional vomiting .     POB: SAB             Right tubal ectopic      PGYN: as above     PMH: Obesity BMI 31     PSH: tonsillectomy , adenoidectomy , ear surgery .     MEDS: PNV     Drug Allergies:   Allergies   Allergen Reactions    Morphine Itching       SOCHX: smoking 2-3 ciggs/day  , THC .     FH: Mother or Father , DM No , HTN No, Other No    /70   Pulse 92   Wt 89.4 kg (197 lb)   LMP 2025 (Approximate)   BMI 30.85 kg/m²   Past Medical History:   Diagnosis Date    Asthma     Depression     Ectopic pregnancy      Past Surgical History:   Procedure Laterality Date    EAR SURGERY      EAR SURGERY      TONSILLECTOMY           Review of Systems  Constitutional: negative  Genitourinary:see above  Integument/breast: negative  Behavioral/Psych: negative  Endocrine: negative     All other systems reviewed and are negative.       Physical Exam:  /70   Pulse 92   Wt 89.4 kg (197 lb)   LMP 2025 (Approximate)   BMI 30.85 kg/m²   Skin: Warm, dry, no lesions or rashes  Extremities: Without clubbing, cyanosis and edema. Palms and nails are normal. Ambulates without difficulty  Neurological: No gross sensory or motor deficits.  Abdomen: Soft, non-tender without masses or organomegaly  bowel sounds normoactive    HOF : suprapubic     FHT : 150 bpm     Assessment:   1. Supervision of elderly primigravida in first trimester    2. 18 weeks gestation of pregnancy            Plan:     25: Patient complains of \" passing out\" twice since her last visit, with no prior history   Discussed blood pressure changes in pregnancy including drop in BP especially in second trimester , advised increased hydration and increased salt intake , with balanced nutritious diet . Advised if episodes become more frequent to go to ED to rule out other causes .     Orders

## 2025-06-06 ENCOUNTER — HOSPITAL ENCOUNTER (OUTPATIENT)
Dept: ULTRASOUND IMAGING | Age: 30
Discharge: HOME | End: 2025-06-08
Attending: OBSTETRICS & GYNECOLOGY
Payer: COMMERCIAL

## 2025-06-06 DIAGNOSIS — Z3A.18 18 WEEKS GESTATION OF PREGNANCY: ICD-10-CM

## 2025-06-06 PROCEDURE — 76805 OB US >/= 14 WKS SNGL FETUS: CPT

## 2025-06-06 PROCEDURE — 76817 TRANSVAGINAL US OBSTETRIC: CPT

## 2025-07-01 ENCOUNTER — ROUTINE PRENATAL (OUTPATIENT)
Dept: OBGYN CLINIC | Age: 30
End: 2025-07-01
Payer: COMMERCIAL

## 2025-07-01 VITALS
DIASTOLIC BLOOD PRESSURE: 70 MMHG | WEIGHT: 196 LBS | HEART RATE: 84 BPM | BODY MASS INDEX: 30.7 KG/M2 | SYSTOLIC BLOOD PRESSURE: 108 MMHG

## 2025-07-01 DIAGNOSIS — Z3A.22 22 WEEKS GESTATION OF PREGNANCY: ICD-10-CM

## 2025-07-01 DIAGNOSIS — O09.511 SUPERVISION OF ELDERLY PRIMIGRAVIDA IN FIRST TRIMESTER: Primary | ICD-10-CM

## 2025-07-01 PROCEDURE — 4004F PT TOBACCO SCREEN RCVD TLK: CPT | Performed by: OBSTETRICS & GYNECOLOGY

## 2025-07-01 PROCEDURE — G8417 CALC BMI ABV UP PARAM F/U: HCPCS | Performed by: OBSTETRICS & GYNECOLOGY

## 2025-07-01 PROCEDURE — 99213 OFFICE O/P EST LOW 20 MIN: CPT | Performed by: OBSTETRICS & GYNECOLOGY

## 2025-07-01 PROCEDURE — G8427 DOCREV CUR MEDS BY ELIG CLIN: HCPCS | Performed by: OBSTETRICS & GYNECOLOGY

## 2025-07-01 NOTE — PROGRESS NOTES
Placed This Encounter   Procedures    US OB 14 PLUS WEEKS SINGLE OR FIRST GESTATION     This procedure can be scheduled via [facility]      Standing Status:   Future     Expected Date:   7/1/2025     Expiration Date:   7/1/2026    CBC with Auto Differential     Standing Status:   Future     Expected Date:   7/1/2025     Expiration Date:   7/1/2026    Glucose tolerance, 1 hour     Standing Status:   Future     Expected Date:   7/1/2025     Expiration Date:   7/1/2026    T Pallidum Screen W/Reflex     Standing Status:   Future     Expected Date:   7/1/2025     Expiration Date:   7/1/2026      No orders of the defined types were placed in this encounter.    Plan:   Amira Stoner MD      Follow-up in 4 weeks  Early 1 hr OGTT - indicated ordered  Hep C screen indicated : no  FLU- not given   TDAP- to be given in third trimester     Amira Stoner M.D., F.A.C.O.G

## 2025-07-22 DIAGNOSIS — Z3A.22 22 WEEKS GESTATION OF PREGNANCY: ICD-10-CM

## 2025-07-22 DIAGNOSIS — O09.511 SUPERVISION OF ELDERLY PRIMIGRAVIDA IN FIRST TRIMESTER: ICD-10-CM

## 2025-07-22 LAB
BASOPHILS # BLD: 0 K/UL (ref 0–0.2)
BASOPHILS NFR BLD: 0.1 %
EOSINOPHIL # BLD: 0.1 K/UL (ref 0–0.7)
EOSINOPHIL NFR BLD: 0.9 %
ERYTHROCYTE [DISTWIDTH] IN BLOOD BY AUTOMATED COUNT: 13 % (ref 11.5–14.5)
GLUCOSE, 1HR PP: 138 MG/DL (ref 60–140)
HCT VFR BLD AUTO: 35.6 % (ref 37–47)
HGB BLD-MCNC: 11.7 G/DL (ref 12–16)
LYMPHOCYTES # BLD: 1.8 K/UL (ref 1–4.8)
LYMPHOCYTES NFR BLD: 20.4 %
MCH RBC QN AUTO: 29.8 PG (ref 27–31.3)
MCHC RBC AUTO-ENTMCNC: 32.9 % (ref 33–37)
MCV RBC AUTO: 90.6 FL (ref 79.4–94.8)
MONOCYTES # BLD: 0.4 K/UL (ref 0.2–0.8)
MONOCYTES NFR BLD: 4.5 %
NEUTROPHILS # BLD: 6.6 K/UL (ref 1.4–6.5)
NEUTS SEG NFR BLD: 73.7 %
PLATELET # BLD AUTO: 207 K/UL (ref 130–400)
RBC # BLD AUTO: 3.93 M/UL (ref 4.2–5.4)
REAGIN+T PALLIDUM IGG+IGM SERPL-IMP: NORMAL
WBC # BLD AUTO: 8.9 K/UL (ref 4.8–10.8)

## 2025-07-23 ENCOUNTER — OFFICE VISIT (OUTPATIENT)
Age: 30
End: 2025-07-23
Payer: COMMERCIAL

## 2025-07-23 VITALS
HEIGHT: 67 IN | SYSTOLIC BLOOD PRESSURE: 100 MMHG | TEMPERATURE: 97.9 F | BODY MASS INDEX: 31.55 KG/M2 | WEIGHT: 201 LBS | DIASTOLIC BLOOD PRESSURE: 64 MMHG | OXYGEN SATURATION: 99 % | HEART RATE: 73 BPM

## 2025-07-23 DIAGNOSIS — Z3A.25 25 WEEKS GESTATION OF PREGNANCY: ICD-10-CM

## 2025-07-23 DIAGNOSIS — Z3A.25 25 WEEKS GESTATION OF PREGNANCY: Primary | ICD-10-CM

## 2025-07-23 LAB
ALBUMIN SERPL-MCNC: 3.7 G/DL (ref 3.5–4.6)
ALP SERPL-CCNC: 65 U/L (ref 40–130)
ALT SERPL-CCNC: 21 U/L (ref 0–33)
ANION GAP SERPL CALCULATED.3IONS-SCNC: 10 MEQ/L (ref 9–15)
AST SERPL-CCNC: 13 U/L (ref 0–35)
BILIRUB SERPL-MCNC: <0.2 MG/DL (ref 0.2–0.7)
BUN SERPL-MCNC: 5 MG/DL (ref 6–20)
CALCIUM SERPL-MCNC: 9.1 MG/DL (ref 8.5–9.9)
CHLORIDE SERPL-SCNC: 105 MEQ/L (ref 95–107)
CO2 SERPL-SCNC: 21 MEQ/L (ref 20–31)
CREAT SERPL-MCNC: 0.38 MG/DL (ref 0.5–0.9)
GLOBULIN SER CALC-MCNC: 2.7 G/DL (ref 2.3–3.5)
GLUCOSE FASTING: 81 MG/DL (ref 70–99)
POTASSIUM SERPL-SCNC: 4.3 MEQ/L (ref 3.4–4.9)
PROT SERPL-MCNC: 6.4 G/DL (ref 6.3–8)
SODIUM SERPL-SCNC: 136 MEQ/L (ref 135–144)

## 2025-07-23 PROCEDURE — 99214 OFFICE O/P EST MOD 30 MIN: CPT | Performed by: NURSE PRACTITIONER

## 2025-07-23 PROCEDURE — G8427 DOCREV CUR MEDS BY ELIG CLIN: HCPCS | Performed by: NURSE PRACTITIONER

## 2025-07-23 PROCEDURE — 4004F PT TOBACCO SCREEN RCVD TLK: CPT | Performed by: NURSE PRACTITIONER

## 2025-07-23 PROCEDURE — G8417 CALC BMI ABV UP PARAM F/U: HCPCS | Performed by: NURSE PRACTITIONER

## 2025-07-23 PROCEDURE — 99213 OFFICE O/P EST LOW 20 MIN: CPT | Performed by: NURSE PRACTITIONER

## 2025-07-23 NOTE — PROGRESS NOTES
[facility]  [facility]  [address] [address]  Dept: [phone]  Dept Fax: [phone]  Loc: [phone]     Candida Hsu Diar, 29 y.o. female Established patient presents today with:  Chief Complaint   Patient presents with    Dizziness     Pt has been getting dizzy and passing out for over a month now    Discuss Labs     Had glucose test yesterday       History of Present Illness  The patient presents for evaluation of fainting episodes.    She experienced a fainting episode on Thursday, preceded by lightheadedness and ringing in her ears. A similar episode occurred on Sunday, but she managed to sit down before losing consciousness. She does not monitor her blood pressure at home. She reports adequate food and fluid intake, consuming approximately 5 to 6 cups of water daily. She is currently 25 weeks and 6 days pregnant with her first child and has ceased smoking, although she continues to vape nicotine. She has an upcoming appointment scheduled for 07/29/2025. She reports feeling fetal movements. She also experiences weakness in her arms and legs when lying down, which she attributes to changes in position.    Her recent glucose test showed a level of 138.    Tobacco: She vapes nicotine.      Past Medical History:   Diagnosis Date    Asthma     Depression     Ectopic pregnancy      Past Surgical History:   Procedure Laterality Date    EAR SURGERY      EAR SURGERY      TONSILLECTOMY       Immunization History   Administered Date(s) Administered    DTaP 1997, 02/16/1998, 04/30/1998, 03/03/1999, 12/12/2002    DTaP, INFANRIX, (age 6w-6y), IM, 0.5mL 03/06/1999    HPV Quadrivalent (Gardasil) 11/12/2008, 01/27/2009, 12/15/2009    Hep B, ENGERIX-B, RECOMBIVAX-HB, (age Birth - 19y), IM, 0.5mL 1997, 02/16/1998, 08/13/1998    Hib PRP-T, ACTHIB (age 2m-5y, Adlt Risk), HIBERIX (age

## 2025-07-29 ENCOUNTER — ROUTINE PRENATAL (OUTPATIENT)
Dept: OBGYN CLINIC | Age: 30
End: 2025-07-29
Payer: COMMERCIAL

## 2025-07-29 VITALS
HEART RATE: 96 BPM | WEIGHT: 200 LBS | BODY MASS INDEX: 31.32 KG/M2 | DIASTOLIC BLOOD PRESSURE: 64 MMHG | SYSTOLIC BLOOD PRESSURE: 98 MMHG

## 2025-07-29 DIAGNOSIS — R73.09 ABNORMAL GLUCOSE TOLERANCE TEST: Primary | ICD-10-CM

## 2025-07-29 DIAGNOSIS — Z3A.26 26 WEEKS GESTATION OF PREGNANCY: ICD-10-CM

## 2025-07-29 DIAGNOSIS — O09.511 SUPERVISION OF ELDERLY PRIMIGRAVIDA IN FIRST TRIMESTER: ICD-10-CM

## 2025-07-29 PROCEDURE — 4004F PT TOBACCO SCREEN RCVD TLK: CPT | Performed by: OBSTETRICS & GYNECOLOGY

## 2025-07-29 PROCEDURE — G8427 DOCREV CUR MEDS BY ELIG CLIN: HCPCS | Performed by: OBSTETRICS & GYNECOLOGY

## 2025-07-29 PROCEDURE — G8417 CALC BMI ABV UP PARAM F/U: HCPCS | Performed by: OBSTETRICS & GYNECOLOGY

## 2025-07-29 PROCEDURE — 99213 OFFICE O/P EST LOW 20 MIN: CPT | Performed by: OBSTETRICS & GYNECOLOGY

## 2025-07-29 RX ORDER — PNV NO.95/FERROUS FUM/FOLIC AC 28MG-0.8MG
1 TABLET ORAL 2 TIMES DAILY
Qty: 60 TABLET | Refills: 2 | Status: SHIPPED | OUTPATIENT
Start: 2025-07-29

## 2025-07-29 NOTE — PROGRESS NOTES
OB visit      Aracelis Anguiaon is a 29 y.o. year old female  @ L 25, 26.4 wks , JACQUE 10/31/25  confirmed by 6 wk scan . Complaints : nausea occasional vomiting .     POB: SAB             Right tubal ectopic      PGYN: as above     PMH: Obesity BMI 31     PSH: tonsillectomy , adenoidectomy , ear surgery .     MEDS: PNV     Drug Allergies:   Allergies   Allergen Reactions    Morphine Itching       SOCHX: smoking 2-3 ciggs/day  , THC .     FH: Mother or Father , DM No , HTN No, Other No    BP 98/64   Pulse 96   Wt 90.7 kg (200 lb)   LMP 2025 (Approximate)   BMI 31.32 kg/m²   Past Medical History:   Diagnosis Date    Asthma     Depression     Ectopic pregnancy      Past Surgical History:   Procedure Laterality Date    EAR SURGERY      EAR SURGERY      TONSILLECTOMY           Review of Systems  Constitutional: negative  Genitourinary:see above  Integument/breast: negative  Behavioral/Psych: negative  Endocrine: negative     All other systems reviewed and are negative.       Physical Exam:  BP 98/64   Pulse 96   Wt 90.7 kg (200 lb)   LMP 2025 (Approximate)   BMI 31.32 kg/m²   Skin: Warm, dry, no lesions or rashes  Extremities: Without clubbing, cyanosis and edema. Palms and nails are normal. Ambulates without difficulty  Neurological: No gross sensory or motor deficits.  Abdomen: Soft, non-tender without masses or organomegaly  bowel sounds normoactive    HOF :umbilical     FHT : 150 bpm     Assessment:   1. Abnormal glucose tolerance test    2. 26 weeks gestation of pregnancy    3. Supervision of elderly primigravida in first trimester              Plan:     25: Patient complains of \" passing out\" twice since her last visit, with no prior history   Discussed blood pressure changes in pregnancy including drop in BP especially in second trimester , advised increased hydration and increased salt intake , with balanced nutritious diet . Advised if episodes become more frequent to go to ED to

## 2025-08-01 ENCOUNTER — HOSPITAL ENCOUNTER (OUTPATIENT)
Dept: ULTRASOUND IMAGING | Age: 30
Discharge: HOME | End: 2025-08-01
Attending: OBSTETRICS & GYNECOLOGY
Payer: COMMERCIAL

## 2025-08-01 DIAGNOSIS — Z3A.22 22 WEEKS GESTATION OF PREGNANCY: ICD-10-CM

## 2025-08-01 DIAGNOSIS — O09.511 SUPERVISION OF ELDERLY PRIMIGRAVIDA IN FIRST TRIMESTER: ICD-10-CM

## 2025-08-01 PROCEDURE — 76817 TRANSVAGINAL US OBSTETRIC: CPT

## 2025-08-01 PROCEDURE — 76815 OB US LIMITED FETUS(S): CPT

## 2025-08-11 DIAGNOSIS — R73.09 ABNORMAL GLUCOSE TOLERANCE TEST: ICD-10-CM

## 2025-08-11 LAB
GLUCOSE 2H P 100 G GLC PO SERPL-MCNC: 137 MG/DL
GLUCOSE 3H P 100 G GLC PO SERPL-MCNC: 96 MG/DL
GLUCOSE BS SERPL-MCNC: 80 MG/DL
GLUCOSE TOLERANCE TEST 1 HOUR: 167 MG/DL

## 2025-08-26 ENCOUNTER — ROUTINE PRENATAL (OUTPATIENT)
Dept: OBGYN CLINIC | Age: 30
End: 2025-08-26
Payer: COMMERCIAL

## 2025-08-26 VITALS
HEART RATE: 84 BPM | BODY MASS INDEX: 31.95 KG/M2 | WEIGHT: 204 LBS | SYSTOLIC BLOOD PRESSURE: 104 MMHG | DIASTOLIC BLOOD PRESSURE: 64 MMHG

## 2025-08-26 DIAGNOSIS — Z3A.30 30 WEEKS GESTATION OF PREGNANCY: Primary | ICD-10-CM

## 2025-08-26 PROCEDURE — 99213 OFFICE O/P EST LOW 20 MIN: CPT | Performed by: OBSTETRICS & GYNECOLOGY

## 2025-08-26 PROCEDURE — G8417 CALC BMI ABV UP PARAM F/U: HCPCS | Performed by: OBSTETRICS & GYNECOLOGY

## 2025-08-26 PROCEDURE — 90715 TDAP VACCINE 7 YRS/> IM: CPT | Performed by: OBSTETRICS & GYNECOLOGY

## 2025-08-26 PROCEDURE — 4004F PT TOBACCO SCREEN RCVD TLK: CPT | Performed by: OBSTETRICS & GYNECOLOGY

## 2025-08-26 PROCEDURE — G8427 DOCREV CUR MEDS BY ELIG CLIN: HCPCS | Performed by: OBSTETRICS & GYNECOLOGY
